# Patient Record
Sex: MALE | Race: OTHER | NOT HISPANIC OR LATINO | URBAN - METROPOLITAN AREA
[De-identification: names, ages, dates, MRNs, and addresses within clinical notes are randomized per-mention and may not be internally consistent; named-entity substitution may affect disease eponyms.]

---

## 2018-08-20 ENCOUNTER — EMERGENCY (EMERGENCY)
Facility: HOSPITAL | Age: 78
LOS: 1 days | Discharge: HOME | End: 2018-08-20
Admitting: EMERGENCY MEDICINE
Payer: MEDICARE

## 2018-08-20 VITALS
SYSTOLIC BLOOD PRESSURE: 141 MMHG | TEMPERATURE: 98 F | HEART RATE: 83 BPM | OXYGEN SATURATION: 100 % | DIASTOLIC BLOOD PRESSURE: 54 MMHG | RESPIRATION RATE: 18 BRPM

## 2018-08-20 PROCEDURE — 99283 EMERGENCY DEPT VISIT LOW MDM: CPT | Mod: 25
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
## 2018-08-21 RX ORDER — TETANUS TOXOID, REDUCED DIPHTHERIA TOXOID AND ACELLULAR PERTUSSIS VACCINE, ADSORBED 5; 2.5; 8; 8; 2.5 [IU]/.5ML; [IU]/.5ML; UG/.5ML; UG/.5ML; UG/.5ML
0.5 SUSPENSION INTRAMUSCULAR ONCE
Qty: 0 | Refills: 0 | Status: COMPLETED | OUTPATIENT
Start: 2018-08-21 | End: 2018-08-21

## 2018-08-21 RX ADMIN — TETANUS TOXOID, REDUCED DIPHTHERIA TOXOID AND ACELLULAR PERTUSSIS VACCINE, ADSORBED 0.5 MILLILITER(S): 5; 2.5; 8; 8; 2.5 SUSPENSION INTRAMUSCULAR at 03:21

## 2018-08-21 NOTE — ED PROVIDER NOTE - OBJECTIVE STATEMENT
79 yo M, nonsmoker with PMH of bradycardia scheduled for pacemaker on Wednesday, presents to ER s/p mechanical fall on Sunday night 11:55PM with upper lip swelling and pain. Pt states he was walking over the divider, tripped over a brick and fell forward striking cement on upper lip, no LOC, no head injury. Pt denies any prodrome prior to fall. Pt came to the ER with concern if the swelling will affects the intubation for the pacemaker procedure. Admits applying ice without much improvement. Pt admits he remember how he fall and was able to get up on his own. Denies any fever, chills, headache, dizziness, visual disturbances, neck pain, back pain, chest pain, sob, abdominal pain, dysuria, leg swelling, or any other complaints.

## 2018-08-21 NOTE — ED PROVIDER NOTE - PLAN OF CARE
Rest, drink plenty of fluids.  Advance activity as tolerated. Apply ice compress to affected area for 15 minutes, 3-4 times per day. Take Tylenol 650mg (Two 325 mg pills) every 4-6 hours as needed for pain. Follow up with your primary care physician in 48-72 hours- bring copies of your results.  Return to the ER for worsening or persistent symptoms, headache, dizziness, nausea, vomiting and/or ANY NEW OR CONCERNING SYMPTOMS. If you have issues obtaining follow up, please call: [phone] to obtain a doctor or specialist who takes your insurance in your area.

## 2018-08-21 NOTE — ED PROVIDER NOTE - MEDICAL DECISION MAKING DETAILS
77 yo M, nonsmoker with PMH of bradycardia scheduled for pacemaker on Wednesday, presents to ER s/p mechanical fall on Sunday night 11:55PM with upper lip swelling and pain.  -well appearing male with PMH of bradycardia, s/p mechanical fall with upper lip swelling 24 hours ago, no LOC, no head trauma, no focal neuro deficits, ambulatory without difficulty, no weakness, no numbness, admits taking baby aspirin daily, AAOx4. CT head offered to patient based on age and aspirin use, discussed risks for bleeding, pt refused CT head at this time. Plan: tetanus shot, ice compress, and discharge to f/u with PCP.

## 2018-08-21 NOTE — ED PROVIDER NOTE - CARE PLAN
Principal Discharge DX:	Fall  Assessment and plan of treatment:	Rest, drink plenty of fluids.  Advance activity as tolerated. Apply ice compress to affected area for 15 minutes, 3-4 times per day. Take Tylenol 650mg (Two 325 mg pills) every 4-6 hours as needed for pain. Follow up with your primary care physician in 48-72 hours- bring copies of your results.  Return to the ER for worsening or persistent symptoms, headache, dizziness, nausea, vomiting and/or ANY NEW OR CONCERNING SYMPTOMS. If you have issues obtaining follow up, please call: [phone] to obtain a doctor or specialist who takes your insurance in your area.  Secondary Diagnosis:	Lip laceration, initial encounter

## 2018-08-21 NOTE — ED PROVIDER NOTE - ATTENDING CONTRIBUTION TO CARE
DR. STEVENS, ATTENDING MD-  I performed a face to face bedside interview with patient regarding history of present illness, review of symptoms and past medical history. I completed an independent physical exam.  I have discussed patient's plan of care with PA.   Documentation as above in the note.   HPI: 77 yo M, nonsmoker with PMH of bradycardia scheduled for pacemaker on Wednesday, presents to ER s/p mechanical fall on > 24 hours prior to arrival with upper lip swelling and pain. Pt on ASA 81 mg daily only, no other Anticoagulants. Denies preceding conditions leading to fall including chest pain, palpitations, dizziness, vision/hearing changes, weakness, abd pain, N/V/D, fevers, chills. Came to be evaluated as he was worried it would effect his PPM procedure on Wed.   EXAM: Well appearing, NAD, eyes EOMI/PERRL, head atraumatic, neck supple, FROM, upper lip with dried blood, minimal swelling, no open wounds, gums and teeth intact, no gum lacerations, no missing teeth, chest wall stable, lungs ctab, heart RRR, abd soft nontender, pelvis stable, gait normal, weight bearing tolerated, stable. Neuro exam normal without any focal deficits  MDM: concern for mech slip and fall leading to upper lip lac/swelling, no other signs trauma and no syncope or presyncopal issues per pt report, benign exam other than above, tdap will be given as pt has unknown tdap history, pt was offered CT head and neck but refused. Also not clinically indicated but on ASA but no focal deficits and no signs severe trauma. Fall occurred > 24 hours ago and neuro intact as well as eating and drinking normally and baseline AOx4. Pt came to ED only to ensure he was clear to get PPM placement, explained that he should still go see EP on pre-scheduled visit to get medically cleared as we are not able to medically clear him otherwise. Pt aware and amenable to plan. Return precautions explained and understood.

## 2018-08-27 PROBLEM — R00.1 BRADYCARDIA, UNSPECIFIED: Chronic | Status: ACTIVE | Noted: 2018-08-21

## 2018-09-10 ENCOUNTER — APPOINTMENT (OUTPATIENT)
Dept: CARDIOLOGY | Facility: CLINIC | Age: 78
End: 2018-09-10
Payer: MEDICARE

## 2018-09-10 ENCOUNTER — NON-APPOINTMENT (OUTPATIENT)
Age: 78
End: 2018-09-10

## 2018-09-10 VITALS
WEIGHT: 80 LBS | OXYGEN SATURATION: 99 % | HEART RATE: 62 BPM | SYSTOLIC BLOOD PRESSURE: 159 MMHG | DIASTOLIC BLOOD PRESSURE: 84 MMHG | HEIGHT: 60 IN | BODY MASS INDEX: 15.71 KG/M2

## 2018-09-10 DIAGNOSIS — R06.02 SHORTNESS OF BREATH: ICD-10-CM

## 2018-09-10 DIAGNOSIS — H81.10 BENIGN PAROXYSMAL VERTIGO, UNSPECIFIED EAR: ICD-10-CM

## 2018-09-10 DIAGNOSIS — Z84.89 FAMILY HISTORY OF OTHER SPECIFIED CONDITIONS: ICD-10-CM

## 2018-09-10 DIAGNOSIS — Z87.81 PERSONAL HISTORY OF (HEALED) TRAUMATIC FRACTURE: ICD-10-CM

## 2018-09-10 DIAGNOSIS — R42 DIZZINESS AND GIDDINESS: ICD-10-CM

## 2018-09-10 DIAGNOSIS — Z78.9 OTHER SPECIFIED HEALTH STATUS: ICD-10-CM

## 2018-09-10 DIAGNOSIS — Z87.898 PERSONAL HISTORY OF OTHER SPECIFIED CONDITIONS: ICD-10-CM

## 2018-09-10 PROCEDURE — 99203 OFFICE O/P NEW LOW 30 MIN: CPT

## 2018-09-10 PROCEDURE — 93000 ELECTROCARDIOGRAM COMPLETE: CPT

## 2018-09-10 RX ORDER — ASPIRIN ENTERIC COATED TABLETS 81 MG 81 MG/1
81 TABLET, DELAYED RELEASE ORAL
Refills: 0 | Status: ACTIVE | COMMUNITY

## 2018-09-10 RX ORDER — MECLIZINE HYDROCHLORIDE 25 MG/1
25 TABLET ORAL 3 TIMES DAILY
Refills: 0 | Status: ACTIVE | COMMUNITY

## 2018-10-15 ENCOUNTER — APPOINTMENT (OUTPATIENT)
Dept: ELECTROPHYSIOLOGY | Facility: CLINIC | Age: 78
End: 2018-10-15

## 2018-11-16 ENCOUNTER — EMERGENCY (EMERGENCY)
Facility: HOSPITAL | Age: 78
LOS: 1 days | Discharge: HOME | End: 2018-11-16
Attending: EMERGENCY MEDICINE | Admitting: EMERGENCY MEDICINE
Payer: MEDICARE

## 2018-11-16 VITALS
HEART RATE: 80 BPM | DIASTOLIC BLOOD PRESSURE: 74 MMHG | OXYGEN SATURATION: 100 % | SYSTOLIC BLOOD PRESSURE: 147 MMHG | RESPIRATION RATE: 18 BRPM

## 2018-11-16 VITALS
RESPIRATION RATE: 17 BRPM | HEART RATE: 68 BPM | OXYGEN SATURATION: 100 % | SYSTOLIC BLOOD PRESSURE: 128 MMHG | DIASTOLIC BLOOD PRESSURE: 70 MMHG

## 2018-11-16 PROCEDURE — 12013 RPR F/E/E/N/L/M 2.6-5.0 CM: CPT | Mod: GC

## 2018-11-16 PROCEDURE — 70486 CT MAXILLOFACIAL W/O DYE: CPT | Mod: 26

## 2018-11-16 PROCEDURE — 76377 3D RENDER W/INTRP POSTPROCES: CPT | Mod: 26

## 2018-11-16 PROCEDURE — 99284 EMERGENCY DEPT VISIT MOD MDM: CPT | Mod: 25,GC

## 2018-11-16 PROCEDURE — 70450 CT HEAD/BRAIN W/O DYE: CPT | Mod: 26

## 2018-11-16 NOTE — ED PROVIDER NOTE - PHYSICAL EXAMINATION
Gen: AAOx3, NAD   Head: NCAT  ENT: Airway patent, moist mucous membranes, nasal passageways clear, no pharyngeal erythema or exudates, uvula midline, no cervical lymphadenopathy  Cardiac: Normal rate, normal rhythm, no murmurs/rubs/gallops appreciated  Respiratory: Lungs CTA B/L  Gastrointestinal: +BS, Abdomen soft, nontender, nondistended, no rebound, no guarding, no organomegaly   MSK: No gross abnormalities, FROM of all four extremities, no edema  HEME: Extremities warm, pulses intact and symmetrical in all four extremities  Skin: + upper lip lac     Neuro: No gross neurologic deficits, CN II-XII intact, no facial asymmetry, no dysarthria, dysdiadochokinesia, strength equal in all four extremities, no gait abnormality Gen: AAOx3, NAD   Head: NCAT  ENT: Airway patent, moist mucous membranes, nasal passageways clear, no pharyngeal erythema or exudates, uvula midline, no cervical lymphadenopathy  Cardiac: Normal rate, normal rhythm, no murmurs/rubs/gallops appreciated  Respiratory: Lungs CTA B/L  Gastrointestinal: +BS, Abdomen soft, nontender, nondistended, no rebound, no guarding, no organomegaly   MSK: No gross abnormalities, FROM of all four extremities, no edema, no midline spinal c-t-l spine tenderness   HEME: Extremities warm, pulses intact and symmetrical in all four extremities  Skin: + upper lip lac     Neuro: No gross neurologic deficits, CN II-XII intact, no facial asymmetry, no dysarthria, dysdiadochokinesia, strength equal in all four extremities, no gait abnormality Gen: AAOx3, NAD   Head: NCAT  ENT: Airway patent, moist mucous membranes, nasal passageways clear, no pharyngeal erythema or exudates, uvula midline, no cervical lymphadenopathy  Cardiac: Normal rate, normal rhythm, no murmurs/rubs/gallops appreciated  Respiratory: Lungs CTA B/L  Gastrointestinal: +BS, Abdomen soft, nontender, nondistended, no rebound, no guarding, no organomegaly   MSK: No gross abnormalities, FROM of all four extremities, no edema, no midline spinal c-t-l spine tenderness   HEME: Extremities warm, pulses intact and symmetrical in all four extremities  Skin: + upper lip lac and inner lip lac through and through     Neuro: No gross neurologic deficits, CN II-XII intact, no facial asymmetry, no dysarthria, dysdiadochokinesia, strength equal in all four extremities, no gait abnormality Gen: AAOx3, NAD   Head: NCAT  ENT: Airway patent, moist mucous membranes, nasal passageways clear,  + upper lip lac and inner lip lac through and through, teeth normal, no loose teeth, able to bite on tongue depressor without difficulty, mild left zygomatic ecchymosis w/out crepitus, EOMI, no nystagmus  Cardiac: Normal rate, normal rhythm, no murmurs/rubs/gallops appreciated  Respiratory: Lungs CTA B/L  Gastrointestinal: +BS, Abdomen soft, nontender, nondistended, no rebound, no guarding,   MSK: No gross abnormalities, FROM of all four extremities, no edema, no midline spinal c-t-l spine tenderness , no pelvic pain on compression  HEME: Extremities warm, pulses intact and symmetrical in all four extremities  Skin: + upper lip lac and inner lip lac through and through   Neuro: No gross neurologic deficits, CN II-XII intact, no facial asymmetry, no dysarthria, no dysdiadochokinesia, strength equal in all four extremities, no gait abnormality
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
## 2018-11-16 NOTE — ED PROVIDER NOTE - NSFOLLOWUPINSTRUCTIONS_ED_ALL_ED_FT
You have one prolene 6-0 suture on your outer lip, have it removed in 7-10 days. Please return if you develop severe lip swelling or puss or fevers or signs of infection. Please see your dentist this week to evaluate your teeth, your CT scans were within normal. Please apply ice to the lip to decrease swelling. Please eat liquids/ soft foods for the next 48-72 hours.     Take tylenol 650 mg every 4 hours as needed for pain, max daily dose 4000 mg/day.   Return to the emergency department for blood in urine, worsening loss of bladder control/loss of bowel control, fever, vomiting, inability to walk, weakness/numbness, or if you have any new or changing symptoms or concerns.    1) Please follow-up with your primary care doctor and dentist within the next 3 days.  Please call today or tomorrow for an appointment.  If you cannot follow-up with your doctor(s), please return to the ED for any urgent issues.  2) If you have any worsening of symptoms or any other concerns please return to the ED immediately.  3) Please continue taking your home medications as directed.  4) You may have been given a copy of your labs and/or imaging.  Please go over these with your primary care doctor.

## 2018-11-16 NOTE — ED PROVIDER NOTE - PROGRESS NOTE DETAILS
Attending MD Monreal: Dental with patient Attending MD Monreal: spoke with dental, no loose teeth, no fractures.  Requested ED close laceration, will sign out to overnight team for laceration repair. Slaughter PGY2: lac repaired w/ one outer 6-0 prolene and 10 interrupted 6-0 absorbables inner lip, , will f/u w/ outpt dentist Tabby CARTY: Signed out by Dr. Monreal pending lac repair. Lac repaired by Dr. Slaughter, reviewed by me. Ok to discharge. Slaughter PGY2: lac repaired w/ one outer 6-0 prolene and 10 interrupted 6-0 absorbables inner lip, , will f/u w/ outpt dentist, family at bedside w/ pt, pt alert and well appearing
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
## 2018-11-16 NOTE — ED ADULT NURSE NOTE - NSIMPLEMENTINTERV_GEN_ALL_ED
Implemented All Fall with Harm Risk Interventions:  [place] to call system. Call bell, personal items and telephone within reach. Instruct patient to call for assistance. Room bathroom lighting operational. Non-slip footwear when patient is off stretcher. Physically safe environment: no spills, clutter or unnecessary equipment. Stretcher in lowest position, wheels locked, appropriate side rails in place. Provide visual cue, wrist band, yellow gown, etc. Monitor gait and stability. Monitor for mental status changes and reorient to person, place, and time. Review medications for side effects contributing to fall risk. Reinforce activity limits and safety measures with patient and family. Provide visual clues: red socks.
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
## 2018-11-16 NOTE — ED PROVIDER NOTE - NS ED ROS FT
CONST: no fevers, no chills, weight loss, weakness, appetite changes  EYES: no pain, vision loss/dipoplia/decreased vision  ENT: no sore throat, no cough  CV: no chest pain, no palpitations  RESP: no shortness of breath  ABD: no abdominal pain, no nausea, no vomitting  : no dysuria, increased frequency, or hematuria  MSK: no back pain  NEURO: no headache or additional neurologic complaints  HEME: no easy bleeding  SKIN:  no rash

## 2018-11-17 PROCEDURE — 70450 CT HEAD/BRAIN W/O DYE: CPT

## 2018-11-17 PROCEDURE — 70486 CT MAXILLOFACIAL W/O DYE: CPT

## 2018-11-17 PROCEDURE — 76377 3D RENDER W/INTRP POSTPROCES: CPT

## 2018-11-17 PROCEDURE — 12013 RPR F/E/E/N/L/M 2.6-5.0 CM: CPT

## 2018-11-17 PROCEDURE — 99284 EMERGENCY DEPT VISIT MOD MDM: CPT | Mod: 25

## 2018-11-17 RX ORDER — AMOXICILLIN 250 MG/5ML
1 SUSPENSION, RECONSTITUTED, ORAL (ML) ORAL
Qty: 15 | Refills: 0
Start: 2018-11-17 | End: 2018-11-21

## 2018-11-17 RX ORDER — AMOXICILLIN 250 MG/5ML
500 SUSPENSION, RECONSTITUTED, ORAL (ML) ORAL ONCE
Qty: 0 | Refills: 0 | Status: COMPLETED | OUTPATIENT
Start: 2018-11-17 | End: 2018-11-17

## 2018-11-17 RX ORDER — ACETAMINOPHEN 500 MG
650 TABLET ORAL ONCE
Qty: 0 | Refills: 0 | Status: COMPLETED | OUTPATIENT
Start: 2018-11-17 | End: 2018-11-17

## 2018-11-17 RX ADMIN — Medication 650 MILLIGRAM(S): at 00:15

## 2018-11-17 RX ADMIN — Medication 650 MILLIGRAM(S): at 02:00

## 2018-11-17 NOTE — ED PROCEDURE NOTE - ATTENDING CONTRIBUTION TO CARE
I have participated in and supervised all key portions of the above procedures and agree with the above documentation. - Tabby CARTY

## 2018-11-17 NOTE — ED PROCEDURE NOTE - PROCEDURE ADDITIONAL DETAILS
one 6-0 prolene to outter lip area and 10 absorbable chromic 6-0 sutures appled to inner upper lip lac, outer lac was approx 0.5-1cm
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
## 2018-11-19 ENCOUNTER — APPOINTMENT (OUTPATIENT)
Dept: CARDIOLOGY | Facility: CLINIC | Age: 78
End: 2018-11-19

## 2018-12-02 ENCOUNTER — EMERGENCY (EMERGENCY)
Facility: HOSPITAL | Age: 78
LOS: 1 days | Discharge: HOME | End: 2018-12-02
Attending: EMERGENCY MEDICINE
Payer: MEDICARE

## 2018-12-02 VITALS
RESPIRATION RATE: 18 BRPM | HEART RATE: 90 BPM | SYSTOLIC BLOOD PRESSURE: 144 MMHG | DIASTOLIC BLOOD PRESSURE: 77 MMHG | TEMPERATURE: 99 F | OXYGEN SATURATION: 99 %

## 2018-12-02 PROCEDURE — 99283 EMERGENCY DEPT VISIT LOW MDM: CPT

## 2018-12-02 PROCEDURE — 73562 X-RAY EXAM OF KNEE 3: CPT

## 2018-12-02 PROCEDURE — 73562 X-RAY EXAM OF KNEE 3: CPT | Mod: 26,RT

## 2018-12-02 NOTE — ED PROVIDER NOTE - PROGRESS NOTE DETAILS
Now pt c/o right knee pain since the fall 2 weeks ago. + Ambulatory with steady gait. No obvious swelling to right knee, tender on anterior knee, full ROMs, No patellar tenderness. N/v- intact.

## 2018-12-02 NOTE — ED PROVIDER NOTE - MEDICAL DECISION MAKING DETAILS
79 y/o male with lip laceration s/p fall 2 weeks ago came for suture removal. No sutures were found as they were done with absorbable sutures, and one non-absorbable fell off. He continues to have knee pain. Will do x-ray of knee and reassess.  ATTG: Dr. Huynh

## 2018-12-02 NOTE — ED PROVIDER NOTE - CARE PLAN
Principal Discharge DX:	Suture check Principal Discharge DX:	Suture check  Secondary Diagnosis:	Knee contracture, right Principal Discharge DX:	Suture check  Secondary Diagnosis:	Knee contracture, right  Secondary Diagnosis:	Knee contusion

## 2018-12-02 NOTE — ED PROVIDER NOTE - PHYSICAL EXAMINATION
NAD, VSS, Afebrile, No visualized stitches on upper lip with well healed wound (internal or external). No warmth, eryth or cellulitis. Neuro- intact.

## 2018-12-02 NOTE — ED PROVIDER NOTE - OBJECTIVE STATEMENT
77yo male pt 79yo male pt, ambulatory c.o suture removal. Pt stated he fell, slipped on a metal sign and fell forward, and had stitches on upper lip (inner and outer) in ED. Denies headache or fever/ chills. Denies sensory changes or weakness to extremities. Denies neck or back pain. Denies CP/SOB/ABD pain.

## 2019-04-24 ENCOUNTER — APPOINTMENT (OUTPATIENT)
Dept: CARDIOLOGY | Facility: CLINIC | Age: 79
End: 2019-04-24
Payer: MEDICARE

## 2019-04-24 ENCOUNTER — NON-APPOINTMENT (OUTPATIENT)
Age: 79
End: 2019-04-24

## 2019-04-24 VITALS
WEIGHT: 82 LBS | SYSTOLIC BLOOD PRESSURE: 126 MMHG | BODY MASS INDEX: 16.1 KG/M2 | HEART RATE: 79 BPM | DIASTOLIC BLOOD PRESSURE: 76 MMHG | HEIGHT: 60 IN | OXYGEN SATURATION: 99 %

## 2019-04-24 PROCEDURE — 93000 ELECTROCARDIOGRAM COMPLETE: CPT

## 2019-04-24 PROCEDURE — 99215 OFFICE O/P EST HI 40 MIN: CPT

## 2019-04-24 NOTE — REASON FOR VISIT
[Follow-Up - Clinic] : a clinic follow-up of [Cardiomyopathy] : cardiomyopathy [Family Member] : family member [FreeTextEntry1] : LBBB

## 2019-04-24 NOTE — PHYSICAL EXAM
[General Appearance - Well Developed] : well developed [Normal Appearance] : normal appearance [General Appearance - Well Nourished] : well nourished [Well Groomed] : well groomed [No Deformities] : no deformities [Normal Conjunctiva] : the conjunctiva exhibited no abnormalities [General Appearance - In No Acute Distress] : no acute distress [Normal Jugular Venous A Waves Present] : normal jugular venous A waves present [Eyelids - No Xanthelasma] : the eyelids demonstrated no xanthelasmas [Normal Jugular Venous V Waves Present] : normal jugular venous V waves present [No Jugular Venous Cade A Waves] : no jugular venous cade A waves [Respiration, Rhythm And Depth] : normal respiratory rhythm and effort [Heart Rate And Rhythm] : heart rate and rhythm were normal [Auscultation Breath Sounds / Voice Sounds] : lungs were clear to auscultation bilaterally [Exaggerated Use Of Accessory Muscles For Inspiration] : no accessory muscle use [Murmurs] : no murmurs present [Heart Sounds] : normal S1 and S2 [Abdomen Tenderness] : non-tender [Abdomen Soft] : soft [Abdomen Mass (___ Cm)] : no abdominal mass palpated [Abnormal Walk] : normal gait [Gait - Sufficient For Exercise Testing] : the gait was sufficient for exercise testing [Cyanosis, Localized] : no localized cyanosis [Nail Clubbing] : no clubbing of the fingernails [Petechial Hemorrhages (___cm)] : no petechial hemorrhages [] : no ischemic changes

## 2019-04-24 NOTE — DISCUSSION/SUMMARY
[FreeTextEntry1] : In summary the patient is a 78-year-old gentleman with dilated cardiomyopathy and left bundle branch block. Will increase his carvedilol dose 6.25 mg bid

## 2019-04-24 NOTE — HISTORY OF PRESENT ILLNESS
[FreeTextEntry1] : I had the pleasure of seeing your patient Bari Russo today.  s you well the patient is a delightful 79-year-old gentleman with a history of left bundle branch block and cardiomyopathy.  He had a PPM placed at [facility] 11/19.  He feels better since the ppm.  He has an exercise tolerance 1/8-1/4 mile.  No chest pain or sob.  He has some evidence of palpitations in the evening.\par \par

## 2019-07-18 ENCOUNTER — RX RENEWAL (OUTPATIENT)
Age: 79
End: 2019-07-18

## 2020-02-19 ENCOUNTER — APPOINTMENT (OUTPATIENT)
Dept: CARDIOLOGY | Facility: CLINIC | Age: 80
End: 2020-02-19

## 2020-02-19 ENCOUNTER — APPOINTMENT (OUTPATIENT)
Dept: CARDIOLOGY | Facility: CLINIC | Age: 80
End: 2020-02-19
Payer: MEDICARE

## 2020-02-19 ENCOUNTER — NON-APPOINTMENT (OUTPATIENT)
Age: 80
End: 2020-02-19

## 2020-02-19 VITALS
WEIGHT: 83 LBS | HEIGHT: 60 IN | DIASTOLIC BLOOD PRESSURE: 77 MMHG | OXYGEN SATURATION: 100 % | HEART RATE: 67 BPM | BODY MASS INDEX: 16.3 KG/M2 | SYSTOLIC BLOOD PRESSURE: 136 MMHG

## 2020-02-19 DIAGNOSIS — I44.7 LEFT BUNDLE-BRANCH BLOCK, UNSPECIFIED: ICD-10-CM

## 2020-02-19 DIAGNOSIS — I42.0 DILATED CARDIOMYOPATHY: ICD-10-CM

## 2020-02-19 PROCEDURE — 99214 OFFICE O/P EST MOD 30 MIN: CPT

## 2020-02-19 PROCEDURE — 93000 ELECTROCARDIOGRAM COMPLETE: CPT

## 2020-02-19 RX ORDER — AMLODIPINE BESYLATE 2.5 MG/1
2.5 TABLET ORAL DAILY
Qty: 30 | Refills: 1 | Status: DISCONTINUED | COMMUNITY
End: 2020-02-19

## 2020-02-19 NOTE — REASON FOR VISIT
[Follow-Up - Clinic] : a clinic follow-up of [Hypertension] : hypertension [FreeTextEntry1] : LBBB [Cardiomyopathy] : cardiomyopathy [Family Member] : family member

## 2020-02-19 NOTE — PHYSICAL EXAM
[General Appearance - Well Developed] : well developed [Well Groomed] : well groomed [Normal Appearance] : normal appearance [General Appearance - In No Acute Distress] : no acute distress [No Deformities] : no deformities [General Appearance - Well Nourished] : well nourished [Normal Jugular Venous A Waves Present] : normal jugular venous A waves present [Normal Conjunctiva] : the conjunctiva exhibited no abnormalities [Eyelids - No Xanthelasma] : the eyelids demonstrated no xanthelasmas [No Jugular Venous Cade A Waves] : no jugular venous cade A waves [Normal Jugular Venous V Waves Present] : normal jugular venous V waves present [Auscultation Breath Sounds / Voice Sounds] : lungs were clear to auscultation bilaterally [Respiration, Rhythm And Depth] : normal respiratory rhythm and effort [Exaggerated Use Of Accessory Muscles For Inspiration] : no accessory muscle use [Murmurs] : no murmurs present [Heart Sounds] : normal S1 and S2 [Heart Rate And Rhythm] : heart rate and rhythm were normal [Abdomen Tenderness] : non-tender [Abdomen Soft] : soft [Gait - Sufficient For Exercise Testing] : the gait was sufficient for exercise testing [Abdomen Mass (___ Cm)] : no abdominal mass palpated [Abnormal Walk] : normal gait [Nail Clubbing] : no clubbing of the fingernails [Cyanosis, Localized] : no localized cyanosis [Petechial Hemorrhages (___cm)] : no petechial hemorrhages [] : no ischemic changes

## 2020-02-19 NOTE — HISTORY OF PRESENT ILLNESS
[FreeTextEntry1] : I had the pleasure of seeing your patient Bari Russo today. He is a delightful 79-year-old gentleman with a history of left bundle branch block and cardiomyopathy.  He had a PPM placed at [place] 11/19.  He feels better since the ppm.  He has an exercise tolerance 1/8-1/4 mile.  No chest pain or sob.  He has some evidence of palpitations in the evening.\par \par

## 2020-02-19 NOTE — DISCUSSION/SUMMARY
[FreeTextEntry1] : In summary the patient is a 78-year-old gentleman with dilated cardiomyopathy and left bundle branch block who is s/p PPM.  Continue current meds.

## 2020-09-14 ENCOUNTER — RX RENEWAL (OUTPATIENT)
Age: 80
End: 2020-09-14

## 2020-09-14 RX ORDER — CARVEDILOL 12.5 MG/1
12.5 TABLET, FILM COATED ORAL TWICE DAILY
Qty: 180 | Refills: 1 | Status: ACTIVE | COMMUNITY
Start: 2019-04-24 | End: 1900-01-01

## 2021-04-18 ENCOUNTER — EMERGENCY (EMERGENCY)
Facility: HOSPITAL | Age: 81
LOS: 1 days | Discharge: HOME | End: 2021-04-18
Attending: EMERGENCY MEDICINE | Admitting: EMERGENCY MEDICINE
Payer: MEDICARE

## 2021-04-18 VITALS
SYSTOLIC BLOOD PRESSURE: 151 MMHG | OXYGEN SATURATION: 100 % | HEART RATE: 77 BPM | TEMPERATURE: 98 F | RESPIRATION RATE: 16 BRPM | DIASTOLIC BLOOD PRESSURE: 77 MMHG

## 2021-04-18 LAB
ALBUMIN SERPL ELPH-MCNC: 4.4 G/DL — SIGNIFICANT CHANGE UP (ref 3.3–5)
ALP SERPL-CCNC: 72 U/L — SIGNIFICANT CHANGE UP (ref 40–120)
ALT FLD-CCNC: 15 U/L — SIGNIFICANT CHANGE UP (ref 4–41)
ANION GAP SERPL CALC-SCNC: 10 MMOL/L — SIGNIFICANT CHANGE UP (ref 7–14)
APPEARANCE UR: CLEAR — SIGNIFICANT CHANGE UP
AST SERPL-CCNC: 22 U/L — SIGNIFICANT CHANGE UP (ref 4–40)
BASOPHILS # BLD AUTO: 0.04 K/UL — SIGNIFICANT CHANGE UP (ref 0–0.2)
BASOPHILS NFR BLD AUTO: 0.9 % — SIGNIFICANT CHANGE UP (ref 0–2)
BILIRUB SERPL-MCNC: 0.2 MG/DL — SIGNIFICANT CHANGE UP (ref 0.2–1.2)
BILIRUB UR-MCNC: NEGATIVE — SIGNIFICANT CHANGE UP
BUN SERPL-MCNC: 23 MG/DL — SIGNIFICANT CHANGE UP (ref 7–23)
CALCIUM SERPL-MCNC: 9.2 MG/DL — SIGNIFICANT CHANGE UP (ref 8.4–10.5)
CHLORIDE SERPL-SCNC: 107 MMOL/L — SIGNIFICANT CHANGE UP (ref 98–107)
CO2 SERPL-SCNC: 25 MMOL/L — SIGNIFICANT CHANGE UP (ref 22–31)
COLOR SPEC: SIGNIFICANT CHANGE UP
CREAT SERPL-MCNC: 1 MG/DL — SIGNIFICANT CHANGE UP (ref 0.5–1.3)
DIFF PNL FLD: NEGATIVE — SIGNIFICANT CHANGE UP
EOSINOPHIL # BLD AUTO: 0.11 K/UL — SIGNIFICANT CHANGE UP (ref 0–0.5)
EOSINOPHIL NFR BLD AUTO: 2.4 % — SIGNIFICANT CHANGE UP (ref 0–6)
GLUCOSE SERPL-MCNC: 88 MG/DL — SIGNIFICANT CHANGE UP (ref 70–99)
GLUCOSE UR QL: NEGATIVE — SIGNIFICANT CHANGE UP
HCT VFR BLD CALC: 31.6 % — LOW (ref 39–50)
HGB BLD-MCNC: 10 G/DL — LOW (ref 13–17)
IANC: 2.81 K/UL — SIGNIFICANT CHANGE UP (ref 1.5–8.5)
IMM GRANULOCYTES NFR BLD AUTO: 0.2 % — SIGNIFICANT CHANGE UP (ref 0–1.5)
KETONES UR-MCNC: NEGATIVE — SIGNIFICANT CHANGE UP
LEUKOCYTE ESTERASE UR-ACNC: NEGATIVE — SIGNIFICANT CHANGE UP
LIDOCAIN IGE QN: 63 U/L — HIGH (ref 7–60)
LYMPHOCYTES # BLD AUTO: 1.04 K/UL — SIGNIFICANT CHANGE UP (ref 1–3.3)
LYMPHOCYTES # BLD AUTO: 22.4 % — SIGNIFICANT CHANGE UP (ref 13–44)
MCHC RBC-ENTMCNC: 31.2 PG — SIGNIFICANT CHANGE UP (ref 27–34)
MCHC RBC-ENTMCNC: 31.6 GM/DL — LOW (ref 32–36)
MCV RBC AUTO: 98.4 FL — SIGNIFICANT CHANGE UP (ref 80–100)
MONOCYTES # BLD AUTO: 0.64 K/UL — SIGNIFICANT CHANGE UP (ref 0–0.9)
MONOCYTES NFR BLD AUTO: 13.8 % — SIGNIFICANT CHANGE UP (ref 2–14)
NEUTROPHILS # BLD AUTO: 2.81 K/UL — SIGNIFICANT CHANGE UP (ref 1.8–7.4)
NEUTROPHILS NFR BLD AUTO: 60.3 % — SIGNIFICANT CHANGE UP (ref 43–77)
NITRITE UR-MCNC: NEGATIVE — SIGNIFICANT CHANGE UP
NRBC # BLD: 0 /100 WBCS — SIGNIFICANT CHANGE UP
NRBC # FLD: 0 K/UL — SIGNIFICANT CHANGE UP
PH UR: 6.5 — SIGNIFICANT CHANGE UP (ref 5–8)
PLATELET # BLD AUTO: 210 K/UL — SIGNIFICANT CHANGE UP (ref 150–400)
POTASSIUM SERPL-MCNC: 4.8 MMOL/L — SIGNIFICANT CHANGE UP (ref 3.5–5.3)
POTASSIUM SERPL-SCNC: 4.8 MMOL/L — SIGNIFICANT CHANGE UP (ref 3.5–5.3)
PROT SERPL-MCNC: 7.1 G/DL — SIGNIFICANT CHANGE UP (ref 6–8.3)
PROT UR-MCNC: ABNORMAL
RBC # BLD: 3.21 M/UL — LOW (ref 4.2–5.8)
RBC # FLD: 13.4 % — SIGNIFICANT CHANGE UP (ref 10.3–14.5)
SODIUM SERPL-SCNC: 142 MMOL/L — SIGNIFICANT CHANGE UP (ref 135–145)
SP GR SPEC: 1.02 — SIGNIFICANT CHANGE UP (ref 1.01–1.02)
UROBILINOGEN FLD QL: SIGNIFICANT CHANGE UP
WBC # BLD: 4.65 K/UL — SIGNIFICANT CHANGE UP (ref 3.8–10.5)
WBC # FLD AUTO: 4.65 K/UL — SIGNIFICANT CHANGE UP (ref 3.8–10.5)

## 2021-04-18 PROCEDURE — 71250 CT THORAX DX C-: CPT | Mod: 26

## 2021-04-18 PROCEDURE — 71101 X-RAY EXAM UNILAT RIBS/CHEST: CPT | Mod: 26,RT

## 2021-04-18 PROCEDURE — 99284 EMERGENCY DEPT VISIT MOD MDM: CPT

## 2021-04-18 PROCEDURE — 74176 CT ABD & PELVIS W/O CONTRAST: CPT | Mod: 26

## 2021-04-18 RX ORDER — IBUPROFEN 200 MG
400 TABLET ORAL ONCE
Refills: 0 | Status: COMPLETED | OUTPATIENT
Start: 2021-04-18 | End: 2021-04-18

## 2021-04-18 RX ORDER — LIDOCAINE 4 G/100G
1 CREAM TOPICAL ONCE
Refills: 0 | Status: COMPLETED | OUTPATIENT
Start: 2021-04-18 | End: 2021-04-18

## 2021-04-18 RX ORDER — ACETAMINOPHEN 500 MG
650 TABLET ORAL ONCE
Refills: 0 | Status: COMPLETED | OUTPATIENT
Start: 2021-04-18 | End: 2021-04-18

## 2021-04-18 RX ADMIN — LIDOCAINE 1 PATCH: 4 CREAM TOPICAL at 21:04

## 2021-04-18 RX ADMIN — LIDOCAINE 1 PATCH: 4 CREAM TOPICAL at 21:01
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
## 2021-04-18 NOTE — ED PROVIDER NOTE - NS ED ROS FT
Constitutional: no fevers, no chills.  Eyes: no visual changes.  Ears: no ear drainage, no ear pain.  Nose: no nasal congestion.  Mouth/Throat: no sore throat.  Cardiovascular: no chest pain.  Respiratory: no shortness of breath, no wheezing, no cough  Gastrointestinal: no nausea, no vomiting, no diarrhea, no abdominal pain.  MSK: no flank pain, no back pain. +R rib trauma   Genitourinary: no dysuria, no hematuria.  Skin: no rashes.  Neuro: no headache

## 2021-04-18 NOTE — ED ADULT NURSE NOTE - INTERVENTIONS DEFINITIONS
[place] to call system/Call bell, personal items and telephone within reach/Instruct patient to call for assistance/Physically safe environment: no spills, clutter or unnecessary equipment/Stretcher in lowest position, wheels locked, appropriate side rails in place

## 2021-04-18 NOTE — ED ADULT NURSE NOTE - CHIEF COMPLAINT
[de-identified] : Going for TSPR with Dr. Rodriguez 9/26/19\par Notes intermittent nasal congestion and sinus pressure.  Uses Flonase and Allegra.  No hx of nasal or sinus surgery.  Decreased parenteral vision.  Sense of smell and taste are good.  \par Also notes occasional spasms in his throat occasionally when eating.  No choking. no throat pain.  Voice is good.  Occasionally gets reflux.  Has taken reflux meds in the past, not on it now.  The patient is a 80y Male complaining of fall.

## 2021-04-18 NOTE — ED PROVIDER NOTE - PHYSICAL EXAMINATION
GEN: Well appearing, well nourished, in no apparent distress.  HEAD: NCAT  HEENT: PERRL, Airway patent, EOMI, non-erythematous pharynx, no exudates, uvula midline, MMM, neck supple, no LAD, no JVD  LUNG: CTAB, no adventitious sounds, no retractions, no nasal flaring  CV: RRR, no murmurs,   Abd: soft, NTND, no rebound or guarding, BS+ in all quadrants, no CVAT  MSK: WWP, Pulses 2+ in extremities, No edema, TTP R posterior and anterior ribs  Neuro:  AAOx3, Ambulatory with stable gait. CALL without laterality  Skin: Warm and dry, no evidence of rash  Psych: normal mood and affect

## 2021-04-18 NOTE — ED PROVIDER NOTE - NSFOLLOWUPINSTRUCTIONS_ED_ALL_ED_FT
You have a rib fracture.     - Take tylenol 650 mg by mouth every 4-6 hours as needed for pain.     - For SEVERE PAIN, Take Oxycodone one tab every 6 hours as needed. NEVER DRIVE OR OPERATE MACHINERY ON OXYCODONE IT WILL CAUSE ACCIDENTS.     - Continue to use the breathing machine as instructed.     - Please see your regular doctor within 72 hours for follow-up care. Return to ER for new or worsening symptoms like coughing or fevers over 100.4F.
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
## 2021-04-18 NOTE — ED PROVIDER NOTE - ATTENDING CONTRIBUTION TO CARE
Statement Selected ARGENTINA Hurtado MD: I have personally performed a face to face bedside history and physical examination of this patient. I have discussed the history, examination, review of systems, assessment and plan of management with the resident. I have reviewed the electronic medical record and amended it to reflect my history, review of systems, physical exam, assessment and plan.   GEN - NAD; well appearing; thin A+O x3   HEAD - NC/AT     EYES - EOMI, no conjunctival pallor, no scleral icterus  ENT -   mucous membranes  moist , no discharge      NECK - Neck supple  PULM - CTA b/l,  symmetric breath sounds, right chest wall TTP   COR -  RRR, S1 S2, no murmurs  ABD - , ND, NT, soft, no guarding, no rebound, no masses    BACK - no CVA tenderness, nontender spine     EXTREMS - no edema, no deformity, warm and well perfused    SKIN - no rash or bruising      NEUROLOGIC - alert, sensation nl, motor 5/5 RUE/LUE/RLE/LLE
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
## 2021-04-18 NOTE — ED PROVIDER NOTE - PATIENT PORTAL LINK FT
You can access the FollowMyHealth Patient Portal offered by [facility] by registering at the following website: [email or web address]. By joining [facility]’s FollowMyHealth portal, you will also be able to view your health information using other applications (apps) compatible with our system.
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
## 2021-04-18 NOTE — ED ADULT TRIAGE NOTE - RESPIRATORY RATE (BREATHS/MIN)
Farnaz Grewal is a 69 year old female presenting with preop.  Pt is having cataract surgery R eye 10/19/20  Medications reviewed and updated.  Denies known Latex allergy or symptoms of Latex sensitivity.  Health Maintenance Due   Topic Date Due   • Depression Screening  04/02/1963                    16

## 2021-04-19 VITALS
RESPIRATION RATE: 17 BRPM | HEART RATE: 63 BPM | DIASTOLIC BLOOD PRESSURE: 85 MMHG | SYSTOLIC BLOOD PRESSURE: 153 MMHG | OXYGEN SATURATION: 97 %

## 2021-04-19 RX ORDER — IBUPROFEN 200 MG
400 TABLET ORAL ONCE
Refills: 0 | Status: COMPLETED | OUTPATIENT
Start: 2021-04-19 | End: 2021-04-19

## 2021-04-19 RX ORDER — ACETAMINOPHEN 500 MG
650 TABLET ORAL ONCE
Refills: 0 | Status: COMPLETED | OUTPATIENT
Start: 2021-04-19 | End: 2021-04-19

## 2021-04-19 RX ORDER — OXYCODONE HYDROCHLORIDE 5 MG/1
1 TABLET ORAL
Qty: 9 | Refills: 0
Start: 2021-04-19 | End: 2021-04-21

## 2021-04-19 RX ADMIN — Medication 400 MILLIGRAM(S): at 02:16

## 2021-04-19 RX ADMIN — Medication 650 MILLIGRAM(S): at 02:16

## 2021-05-01 ENCOUNTER — INPATIENT (INPATIENT)
Facility: HOSPITAL | Age: 81
LOS: 3 days | Discharge: HOME | DRG: 183 | End: 2021-05-05
Attending: HOSPITALIST | Admitting: HOSPITALIST
Payer: MEDICARE

## 2021-05-01 ENCOUNTER — EMERGENCY (EMERGENCY)
Facility: HOSPITAL | Age: 81
LOS: 1 days | Discharge: ANOTHER HOSPITAL | End: 2021-05-01
Attending: STUDENT IN AN ORGANIZED HEALTH CARE EDUCATION/TRAINING PROGRAM | Admitting: STUDENT IN AN ORGANIZED HEALTH CARE EDUCATION/TRAINING PROGRAM
Payer: MEDICARE

## 2021-05-01 VITALS
TEMPERATURE: 98 F | DIASTOLIC BLOOD PRESSURE: 66 MMHG | RESPIRATION RATE: 16 BRPM | HEART RATE: 62 BPM | OXYGEN SATURATION: 100 % | SYSTOLIC BLOOD PRESSURE: 135 MMHG

## 2021-05-01 VITALS
DIASTOLIC BLOOD PRESSURE: 71 MMHG | OXYGEN SATURATION: 100 % | RESPIRATION RATE: 16 BRPM | HEART RATE: 78 BPM | SYSTOLIC BLOOD PRESSURE: 148 MMHG

## 2021-05-01 VITALS
HEART RATE: 63 BPM | TEMPERATURE: 98 F | SYSTOLIC BLOOD PRESSURE: 169 MMHG | WEIGHT: 82.89 LBS | OXYGEN SATURATION: 100 % | DIASTOLIC BLOOD PRESSURE: 79 MMHG | RESPIRATION RATE: 20 BRPM | HEIGHT: 60 IN

## 2021-05-01 LAB
ALBUMIN SERPL ELPH-MCNC: 4.5 G/DL — SIGNIFICANT CHANGE UP (ref 3.3–5)
ALP SERPL-CCNC: 78 U/L — SIGNIFICANT CHANGE UP (ref 40–120)
ALT FLD-CCNC: 10 U/L — SIGNIFICANT CHANGE UP (ref 4–41)
ANION GAP SERPL CALC-SCNC: 11 MMOL/L — SIGNIFICANT CHANGE UP (ref 7–14)
APPEARANCE UR: ABNORMAL
APTT BLD: 31.2 SEC — SIGNIFICANT CHANGE UP (ref 27–36.3)
AST SERPL-CCNC: 18 U/L — SIGNIFICANT CHANGE UP (ref 4–40)
BACTERIA # UR AUTO: NEGATIVE — SIGNIFICANT CHANGE UP
BASE EXCESS BLDV CALC-SCNC: -0.2 MMOL/L — SIGNIFICANT CHANGE UP (ref -3–2)
BASOPHILS # BLD AUTO: 0.04 K/UL — SIGNIFICANT CHANGE UP (ref 0–0.2)
BASOPHILS NFR BLD AUTO: 0.5 % — SIGNIFICANT CHANGE UP (ref 0–2)
BILIRUB SERPL-MCNC: 0.2 MG/DL — SIGNIFICANT CHANGE UP (ref 0.2–1.2)
BILIRUB UR-MCNC: NEGATIVE — SIGNIFICANT CHANGE UP
BLD GP AB SCN SERPL QL: NEGATIVE — SIGNIFICANT CHANGE UP
BLOOD GAS VENOUS - CREATININE: 1.7 MG/DL — HIGH (ref 0.5–1.3)
BLOOD GAS VENOUS COMPREHENSIVE RESULT: SIGNIFICANT CHANGE UP
BUN SERPL-MCNC: 33 MG/DL — HIGH (ref 7–23)
CALCIUM SERPL-MCNC: 9.1 MG/DL — SIGNIFICANT CHANGE UP (ref 8.4–10.5)
CHLORIDE BLDV-SCNC: 103 MMOL/L — SIGNIFICANT CHANGE UP (ref 96–108)
CHLORIDE SERPL-SCNC: 101 MMOL/L — SIGNIFICANT CHANGE UP (ref 98–107)
CO2 SERPL-SCNC: 24 MMOL/L — SIGNIFICANT CHANGE UP (ref 22–31)
COLOR SPEC: ABNORMAL
CREAT SERPL-MCNC: 1.54 MG/DL — HIGH (ref 0.5–1.3)
DIFF PNL FLD: NEGATIVE — SIGNIFICANT CHANGE UP
EOSINOPHIL # BLD AUTO: 0.04 K/UL — SIGNIFICANT CHANGE UP (ref 0–0.5)
EOSINOPHIL NFR BLD AUTO: 0.5 % — SIGNIFICANT CHANGE UP (ref 0–6)
EPI CELLS # UR: 2 /HPF — SIGNIFICANT CHANGE UP (ref 0–5)
GAS PNL BLDV: 140 MMOL/L — SIGNIFICANT CHANGE UP (ref 136–146)
GLUCOSE BLDV-MCNC: 104 MG/DL — HIGH (ref 70–99)
GLUCOSE SERPL-MCNC: 105 MG/DL — HIGH (ref 70–99)
GLUCOSE UR QL: NEGATIVE — SIGNIFICANT CHANGE UP
GRAN CASTS # UR COMP ASSIST: 2 /LPF — HIGH
HCO3 BLDV-SCNC: 22 MMOL/L — SIGNIFICANT CHANGE UP (ref 20–27)
HCT VFR BLD CALC: 32.5 % — LOW (ref 39–50)
HCT VFR BLDA CALC: 33.6 % — LOW (ref 39–51)
HGB BLD CALC-MCNC: 10.9 G/DL — LOW (ref 13–17)
HGB BLD-MCNC: 10.5 G/DL — LOW (ref 13–17)
HYALINE CASTS # UR AUTO: 5 /LPF — SIGNIFICANT CHANGE UP (ref 0–7)
IANC: 6.06 K/UL — SIGNIFICANT CHANGE UP (ref 1.5–8.5)
IMM GRANULOCYTES NFR BLD AUTO: 0.3 % — SIGNIFICANT CHANGE UP (ref 0–1.5)
INR BLD: 1.05 RATIO — SIGNIFICANT CHANGE UP (ref 0.88–1.16)
KETONES UR-MCNC: NEGATIVE — SIGNIFICANT CHANGE UP
LACTATE BLDV-MCNC: 0.8 MMOL/L — SIGNIFICANT CHANGE UP (ref 0.5–2)
LEUKOCYTE ESTERASE UR-ACNC: NEGATIVE — SIGNIFICANT CHANGE UP
LIDOCAIN IGE QN: 55 U/L — SIGNIFICANT CHANGE UP (ref 7–60)
LYMPHOCYTES # BLD AUTO: 0.85 K/UL — LOW (ref 1–3.3)
LYMPHOCYTES # BLD AUTO: 11.5 % — LOW (ref 13–44)
MCHC RBC-ENTMCNC: 30.6 PG — SIGNIFICANT CHANGE UP (ref 27–34)
MCHC RBC-ENTMCNC: 32.3 GM/DL — SIGNIFICANT CHANGE UP (ref 32–36)
MCV RBC AUTO: 94.8 FL — SIGNIFICANT CHANGE UP (ref 80–100)
MONOCYTES # BLD AUTO: 0.4 K/UL — SIGNIFICANT CHANGE UP (ref 0–0.9)
MONOCYTES NFR BLD AUTO: 5.4 % — SIGNIFICANT CHANGE UP (ref 2–14)
NEUTROPHILS # BLD AUTO: 6.06 K/UL — SIGNIFICANT CHANGE UP (ref 1.8–7.4)
NEUTROPHILS NFR BLD AUTO: 81.8 % — HIGH (ref 43–77)
NITRITE UR-MCNC: NEGATIVE — SIGNIFICANT CHANGE UP
NRBC # BLD: 0 /100 WBCS — SIGNIFICANT CHANGE UP
NRBC # FLD: 0 K/UL — SIGNIFICANT CHANGE UP
PCO2 BLDV: 52 MMHG — HIGH (ref 41–51)
PH BLDV: 7.31 — LOW (ref 7.32–7.43)
PH UR: 6 — SIGNIFICANT CHANGE UP (ref 5–8)
PLATELET # BLD AUTO: 187 K/UL — SIGNIFICANT CHANGE UP (ref 150–400)
PO2 BLDV: <24 MMHG — LOW (ref 35–40)
POTASSIUM BLDV-SCNC: 4 MMOL/L — SIGNIFICANT CHANGE UP (ref 3.4–4.5)
POTASSIUM SERPL-MCNC: 4 MMOL/L — SIGNIFICANT CHANGE UP (ref 3.5–5.3)
POTASSIUM SERPL-SCNC: 4 MMOL/L — SIGNIFICANT CHANGE UP (ref 3.5–5.3)
PROT SERPL-MCNC: 7.5 G/DL — SIGNIFICANT CHANGE UP (ref 6–8.3)
PROT UR-MCNC: ABNORMAL
PROTHROM AB SERPL-ACNC: 11.9 SEC — SIGNIFICANT CHANGE UP (ref 10.6–13.6)
RBC # BLD: 3.43 M/UL — LOW (ref 4.2–5.8)
RBC # FLD: 13.1 % — SIGNIFICANT CHANGE UP (ref 10.3–14.5)
RBC CASTS # UR COMP ASSIST: 5 /HPF — HIGH (ref 0–4)
RH IG SCN BLD-IMP: POSITIVE — SIGNIFICANT CHANGE UP
SAO2 % BLDV: 27.1 % — LOW (ref 60–85)
SARS-COV-2 RNA SPEC QL NAA+PROBE: SIGNIFICANT CHANGE UP
SODIUM SERPL-SCNC: 136 MMOL/L — SIGNIFICANT CHANGE UP (ref 135–145)
SP GR SPEC: 1.02 — SIGNIFICANT CHANGE UP (ref 1.01–1.02)
UROBILINOGEN FLD QL: SIGNIFICANT CHANGE UP
WBC # BLD: 7.41 K/UL — SIGNIFICANT CHANGE UP (ref 3.8–10.5)
WBC # FLD AUTO: 7.41 K/UL — SIGNIFICANT CHANGE UP (ref 3.8–10.5)
WBC UR QL: 3 /HPF — SIGNIFICANT CHANGE UP (ref 0–5)

## 2021-05-01 PROCEDURE — 71046 X-RAY EXAM CHEST 2 VIEWS: CPT | Mod: 26

## 2021-05-01 PROCEDURE — 99285 EMERGENCY DEPT VISIT HI MDM: CPT | Mod: CS

## 2021-05-01 PROCEDURE — 99285 EMERGENCY DEPT VISIT HI MDM: CPT | Mod: GC

## 2021-05-01 PROCEDURE — 71250 CT THORAX DX C-: CPT | Mod: 26,MA

## 2021-05-01 PROCEDURE — 74177 CT ABD & PELVIS W/CONTRAST: CPT | Mod: 26

## 2021-05-01 RX ORDER — SODIUM CHLORIDE 9 MG/ML
1000 INJECTION INTRAMUSCULAR; INTRAVENOUS; SUBCUTANEOUS
Refills: 0 | Status: DISCONTINUED | OUTPATIENT
Start: 2021-05-01 | End: 2021-05-04

## 2021-05-01 RX ORDER — ACETAMINOPHEN 500 MG
650 TABLET ORAL ONCE
Refills: 0 | Status: COMPLETED | OUTPATIENT
Start: 2021-05-01 | End: 2021-05-01

## 2021-05-01 RX ORDER — MORPHINE SULFATE 50 MG/1
2 CAPSULE, EXTENDED RELEASE ORAL ONCE
Refills: 0 | Status: DISCONTINUED | OUTPATIENT
Start: 2021-05-01 | End: 2021-05-01

## 2021-05-01 RX ADMIN — SODIUM CHLORIDE 125 MILLILITER(S): 9 INJECTION INTRAMUSCULAR; INTRAVENOUS; SUBCUTANEOUS at 13:19

## 2021-05-01 RX ADMIN — MORPHINE SULFATE 2 MILLIGRAM(S): 50 CAPSULE, EXTENDED RELEASE ORAL at 17:05

## 2021-05-01 NOTE — ED PROVIDER NOTE - CLINICAL SUMMARY MEDICAL DECISION MAKING FREE TEXT BOX
81 year old male with PMH of CAD (+pacemaker) presents to the ED complaining abdominal pain for 2 days. HD stable. Abdomen soft with tenderness to RUQ/RLQ. Imp: Right sided abdominal pain concerning for acute appendicitis vs an acute biliary pathology. Plan for labs, CT A/P, analgesics, IV hydration, reassess.

## 2021-05-01 NOTE — ED ADULT NURSE NOTE - OBJECTIVE STATEMENT
PT AOx4, ambulatory c/o RLQ and RUQ pain started 2 days ago with worsening today. Pt states pain feel "sharp stabbing" and is constant. pt abd soft and tender. pt had 3 episodes of vomiting this morning but not nauseas now. pt denies diarrhea, chest pain, SOB, fever, chills. 18g placed in right ac, labs sent.

## 2021-05-01 NOTE — ED ADULT TRIAGE NOTE - CHIEF COMPLAINT QUOTE
abd pains x 3 days. vomited x1 this am.. states smaller amts urine , decreased appetite. abd pains x 3 days. vomited x1 this am.. states smaller amts urine , decreased appetite.  renaldo ch pains.

## 2021-05-01 NOTE — ED ADULT NURSE NOTE - CHIEF COMPLAINT QUOTE
abd pains x 3 days. vomited x1 this am.. states smaller amts urine , decreased appetite.  renaldo ch pains.

## 2021-05-01 NOTE — CONSULT NOTE ADULT - ASSESSMENT
82 y/o male with isolated right 9-11th rib fractures    - Patient has isolated traumatic findings and will need transfer to [facility] for evaluation by Trauma Surgery Service.  -Patient is stable for transportation  -Case was discussed with General Surgeon on call at [facility]Dr. Schultz  -Surgery team at [facility] called and notified of patient's transfer
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
## 2021-05-01 NOTE — ED PROVIDER NOTE - PROGRESS NOTE DETAILS
RICKYATEL: pt signed out to me by Dr. Eller, awaiting CT abd/pelvis results, pt found to have new 9th and 11th rib fx, 10th rib fracture from 2 weeks ago, discussed with radiology these are new, pt reevaluated c/o pain and nausea, pt given pain medication and surgery consult called [facility]: surgery wants pt to be transferred to [facility] for trauma consult, pt is unsure of wanting to go on the ambulance, he will speak to family, transfer center aware

## 2021-05-01 NOTE — ED PROVIDER NOTE - OBJECTIVE STATEMENT
81 year old male with PMH of CAD (+pacemaker) presents to the ED complaining abdominal pain for 2 days. Pt describes pain starting from RUQ radiating to the RLQ, sharp, constant, no relieving or aggravating factors. Pt reports associated nausea and 3 episodes of non-bloody non-bilious vomiting. Denies diarrhea, constipation, melena, hematochezia, fevers and chills. Also denies chest pain, dyspnea, palpitations, dizziness, weakness, numbness or tingling sensation. Denies any other complaints. Of note, pt states he fell a few weeks ago and had sustained a fracture of the right 10th rib, but this pain had improved.

## 2021-05-01 NOTE — ED ADULT NURSE NOTE - NSIMPLEMENTINTERV_GEN_ALL_ED
Implemented All Fall with Harm Risk Interventions:  [place] to call system. Call bell, personal items and telephone within reach. Instruct patient to call for assistance. Room bathroom lighting operational. Non-slip footwear when patient is off stretcher. Physically safe environment: no spills, clutter or unnecessary equipment. Stretcher in lowest position, wheels locked, appropriate side rails in place. Provide visual cue, wrist band, yellow gown, etc. Monitor gait and stability. Monitor for mental status changes and reorient to person, place, and time. Review medications for side effects contributing to fall risk. Reinforce activity limits and safety measures with patient and family. Provide visual clues: red socks.

## 2021-05-01 NOTE — ED PROVIDER NOTE - ATTENDING CONTRIBUTION TO CARE
81M with pmh bradycardia s/p PPM, hypothyroidism presenting with 2 days of diffuse right sided abd pain with associated nausea and vomiting. No hx abdominal surgeries. No fever, chills, cp, sob, diarrhea, dysuria, headache, weakness, numbness    GEN: NAD, awake, well appearing  HEENT: NCAT, MMM, normal conjunctiva, perrl  CHEST/LUNGS: Non-tachypneic, CTAB, bilateral breath sounds  CARDIAC: Non-tachycardic, s1s2, normal perfusion, no peripheral edema  ABDOMEN: Soft, right sided abdominal tenderness RLQ>RUQ, No rebound/guarding  MSK: No joint tenderness, no gross deformity of extremities  SKIN: No rashes, no petechiae, no vesicles  NEURO: CN grossly intact, normal coordination, no focal motor or sensory deficits  PSYCH: Alert, appropriate, cooperative     Patient presenting right sided abdominal pain with tenderness and associated nausea and vomiting. DDx includes appendicitis, colitis, cholecystitis. Screening labs and CT to assess for etiology of pain.

## 2021-05-01 NOTE — ED ADULT NURSE NOTE - NSIMPLEMENTINTERV_GEN_ALL_ED
Implemented All Fall Risk Interventions:  [place] to call system. Call bell, personal items and telephone within reach. Instruct patient to call for assistance. Room bathroom lighting operational. Non-slip footwear when patient is off stretcher. Physically safe environment: no spills, clutter or unnecessary equipment. Stretcher in lowest position, wheels locked, appropriate side rails in place. Provide visual cue, wrist band, yellow gown, etc. Monitor gait and stability. Monitor for mental status changes and reorient to person, place, and time. Review medications for side effects contributing to fall risk. Reinforce activity limits and safety measures with patient and family.
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
## 2021-05-02 DIAGNOSIS — I50.22 CHRONIC SYSTOLIC (CONGESTIVE) HEART FAILURE: ICD-10-CM

## 2021-05-02 DIAGNOSIS — Z02.9 ENCOUNTER FOR ADMINISTRATIVE EXAMINATIONS, UNSPECIFIED: ICD-10-CM

## 2021-05-02 DIAGNOSIS — R10.9 UNSPECIFIED ABDOMINAL PAIN: ICD-10-CM

## 2021-05-02 DIAGNOSIS — Z29.9 ENCOUNTER FOR PROPHYLACTIC MEASURES, UNSPECIFIED: ICD-10-CM

## 2021-05-02 DIAGNOSIS — N32.89 OTHER SPECIFIED DISORDERS OF BLADDER: ICD-10-CM

## 2021-05-02 DIAGNOSIS — S22.49XA MULTIPLE FRACTURES OF RIBS, UNSPECIFIED SIDE, INITIAL ENCOUNTER FOR CLOSED FRACTURE: ICD-10-CM

## 2021-05-02 DIAGNOSIS — I10 ESSENTIAL (PRIMARY) HYPERTENSION: ICD-10-CM

## 2021-05-02 DIAGNOSIS — N17.9 ACUTE KIDNEY FAILURE, UNSPECIFIED: ICD-10-CM

## 2021-05-02 LAB
ALBUMIN SERPL ELPH-MCNC: 3.4 G/DL — SIGNIFICANT CHANGE UP (ref 3.3–5)
ALP SERPL-CCNC: 57 U/L — SIGNIFICANT CHANGE UP (ref 40–120)
ALT FLD-CCNC: 10 U/L — SIGNIFICANT CHANGE UP (ref 10–45)
ANION GAP SERPL CALC-SCNC: 12 MMOL/L — SIGNIFICANT CHANGE UP (ref 5–17)
APPEARANCE UR: CLEAR — SIGNIFICANT CHANGE UP
AST SERPL-CCNC: 16 U/L — SIGNIFICANT CHANGE UP (ref 10–40)
BACTERIA # UR AUTO: NEGATIVE — SIGNIFICANT CHANGE UP
BILIRUB SERPL-MCNC: 0.2 MG/DL — SIGNIFICANT CHANGE UP (ref 0.2–1.2)
BILIRUB UR-MCNC: NEGATIVE — SIGNIFICANT CHANGE UP
BUN SERPL-MCNC: 19 MG/DL — SIGNIFICANT CHANGE UP (ref 7–23)
CALCIUM SERPL-MCNC: 8.5 MG/DL — SIGNIFICANT CHANGE UP (ref 8.4–10.5)
CHLORIDE SERPL-SCNC: 107 MMOL/L — SIGNIFICANT CHANGE UP (ref 96–108)
CK SERPL-CCNC: 223 U/L — HIGH (ref 30–200)
CO2 SERPL-SCNC: 21 MMOL/L — LOW (ref 22–31)
COLOR SPEC: COLORLESS — SIGNIFICANT CHANGE UP
CREAT ?TM UR-MCNC: 33 MG/DL — SIGNIFICANT CHANGE UP
CREAT SERPL-MCNC: 1.28 MG/DL — SIGNIFICANT CHANGE UP (ref 0.5–1.3)
DIFF PNL FLD: NEGATIVE — SIGNIFICANT CHANGE UP
EPI CELLS # UR: 0 /HPF — SIGNIFICANT CHANGE UP
GLUCOSE SERPL-MCNC: 83 MG/DL — SIGNIFICANT CHANGE UP (ref 70–99)
GLUCOSE UR QL: NEGATIVE — SIGNIFICANT CHANGE UP
HCT VFR BLD CALC: 27.9 % — LOW (ref 39–50)
HGB BLD-MCNC: 9.2 G/DL — LOW (ref 13–17)
KETONES UR-MCNC: NEGATIVE — SIGNIFICANT CHANGE UP
LEUKOCYTE ESTERASE UR-ACNC: NEGATIVE — SIGNIFICANT CHANGE UP
MAGNESIUM SERPL-MCNC: 1.5 MG/DL — LOW (ref 1.6–2.6)
MCHC RBC-ENTMCNC: 31.2 PG — SIGNIFICANT CHANGE UP (ref 27–34)
MCHC RBC-ENTMCNC: 33 GM/DL — SIGNIFICANT CHANGE UP (ref 32–36)
MCV RBC AUTO: 94.6 FL — SIGNIFICANT CHANGE UP (ref 80–100)
NITRITE UR-MCNC: NEGATIVE — SIGNIFICANT CHANGE UP
NRBC # BLD: 0 /100 WBCS — SIGNIFICANT CHANGE UP (ref 0–0)
PH UR: 6 — SIGNIFICANT CHANGE UP (ref 5–8)
PHOSPHATE SERPL-MCNC: 2.1 MG/DL — LOW (ref 2.5–4.5)
PLATELET # BLD AUTO: 155 K/UL — SIGNIFICANT CHANGE UP (ref 150–400)
POTASSIUM SERPL-MCNC: 3.7 MMOL/L — SIGNIFICANT CHANGE UP (ref 3.5–5.3)
POTASSIUM SERPL-SCNC: 3.7 MMOL/L — SIGNIFICANT CHANGE UP (ref 3.5–5.3)
PROT SERPL-MCNC: 6 G/DL — SIGNIFICANT CHANGE UP (ref 6–8.3)
PROT UR-MCNC: NEGATIVE — SIGNIFICANT CHANGE UP
RBC # BLD: 2.95 M/UL — LOW (ref 4.2–5.8)
RBC # FLD: 13.2 % — SIGNIFICANT CHANGE UP (ref 10.3–14.5)
RBC CASTS # UR COMP ASSIST: 3 /HPF — SIGNIFICANT CHANGE UP (ref 0–4)
SODIUM SERPL-SCNC: 140 MMOL/L — SIGNIFICANT CHANGE UP (ref 135–145)
SODIUM UR-SCNC: 98 MMOL/L — SIGNIFICANT CHANGE UP
SP GR SPEC: 1.02 — SIGNIFICANT CHANGE UP (ref 1.01–1.02)
UROBILINOGEN FLD QL: NEGATIVE — SIGNIFICANT CHANGE UP
WBC # BLD: 4.62 K/UL — SIGNIFICANT CHANGE UP (ref 3.8–10.5)
WBC # FLD AUTO: 4.62 K/UL — SIGNIFICANT CHANGE UP (ref 3.8–10.5)
WBC UR QL: 1 /HPF — SIGNIFICANT CHANGE UP (ref 0–5)

## 2021-05-02 PROCEDURE — 12345: CPT | Mod: NC

## 2021-05-02 PROCEDURE — 99223 1ST HOSP IP/OBS HIGH 75: CPT | Mod: GC

## 2021-05-02 RX ORDER — LEVOTHYROXINE SODIUM 125 MCG
1 TABLET ORAL
Qty: 0 | Refills: 0 | DISCHARGE

## 2021-05-02 RX ORDER — SODIUM CHLORIDE 9 MG/ML
500 INJECTION INTRAMUSCULAR; INTRAVENOUS; SUBCUTANEOUS ONCE
Refills: 0 | Status: COMPLETED | OUTPATIENT
Start: 2021-05-02 | End: 2021-05-02

## 2021-05-02 RX ORDER — CARVEDILOL PHOSPHATE 80 MG/1
1 CAPSULE, EXTENDED RELEASE ORAL
Qty: 0 | Refills: 0 | DISCHARGE

## 2021-05-02 RX ORDER — HEPARIN SODIUM 5000 [USP'U]/ML
5000 INJECTION INTRAVENOUS; SUBCUTANEOUS EVERY 8 HOURS
Refills: 0 | Status: DISCONTINUED | OUTPATIENT
Start: 2021-05-02 | End: 2021-05-05

## 2021-05-02 RX ORDER — ACETAMINOPHEN 500 MG
650 TABLET ORAL EVERY 6 HOURS
Refills: 0 | Status: DISCONTINUED | OUTPATIENT
Start: 2021-05-02 | End: 2021-05-05

## 2021-05-02 RX ORDER — SODIUM CHLORIDE 9 MG/ML
500 INJECTION, SOLUTION INTRAVENOUS ONCE
Refills: 0 | Status: COMPLETED | OUTPATIENT
Start: 2021-05-02 | End: 2021-05-02

## 2021-05-02 RX ORDER — AMLODIPINE BESYLATE 2.5 MG/1
1 TABLET ORAL
Qty: 0 | Refills: 0 | DISCHARGE

## 2021-05-02 RX ORDER — DONEPEZIL HYDROCHLORIDE 10 MG/1
2 TABLET, FILM COATED ORAL
Qty: 0 | Refills: 0 | DISCHARGE

## 2021-05-02 RX ORDER — PANTOPRAZOLE SODIUM 20 MG/1
40 TABLET, DELAYED RELEASE ORAL DAILY
Refills: 0 | Status: DISCONTINUED | OUTPATIENT
Start: 2021-05-02 | End: 2021-05-04

## 2021-05-02 RX ORDER — LEVOTHYROXINE SODIUM 125 MCG
100 TABLET ORAL DAILY
Refills: 0 | Status: DISCONTINUED | OUTPATIENT
Start: 2021-05-02 | End: 2021-05-05

## 2021-05-02 RX ORDER — SUCRALFATE 1 G
1 TABLET ORAL
Refills: 0 | Status: DISCONTINUED | OUTPATIENT
Start: 2021-05-02 | End: 2021-05-05

## 2021-05-02 RX ORDER — TAMSULOSIN HYDROCHLORIDE 0.4 MG/1
1 CAPSULE ORAL
Qty: 0 | Refills: 0 | DISCHARGE

## 2021-05-02 RX ORDER — LIDOCAINE 4 G/100G
1 CREAM TOPICAL EVERY 24 HOURS
Refills: 0 | Status: DISCONTINUED | OUTPATIENT
Start: 2021-05-02 | End: 2021-05-05

## 2021-05-02 RX ORDER — TRAMADOL HYDROCHLORIDE 50 MG/1
25 TABLET ORAL EVERY 6 HOURS
Refills: 0 | Status: DISCONTINUED | OUTPATIENT
Start: 2021-05-02 | End: 2021-05-04

## 2021-05-02 RX ORDER — OXYCODONE HYDROCHLORIDE 5 MG/1
5 TABLET ORAL EVERY 8 HOURS
Refills: 0 | Status: DISCONTINUED | OUTPATIENT
Start: 2021-05-02 | End: 2021-05-04

## 2021-05-02 RX ORDER — TAMSULOSIN HYDROCHLORIDE 0.4 MG/1
0.4 CAPSULE ORAL AT BEDTIME
Refills: 0 | Status: DISCONTINUED | OUTPATIENT
Start: 2021-05-02 | End: 2021-05-05

## 2021-05-02 RX ORDER — ACETAMINOPHEN 500 MG
650 TABLET ORAL EVERY 6 HOURS
Refills: 0 | Status: DISCONTINUED | OUTPATIENT
Start: 2021-05-02 | End: 2021-05-02

## 2021-05-02 RX ORDER — CARVEDILOL PHOSPHATE 80 MG/1
12.5 CAPSULE, EXTENDED RELEASE ORAL EVERY 12 HOURS
Refills: 0 | Status: DISCONTINUED | OUTPATIENT
Start: 2021-05-02 | End: 2021-05-05

## 2021-05-02 RX ORDER — DONEPEZIL HYDROCHLORIDE 10 MG/1
1 TABLET, FILM COATED ORAL
Qty: 0 | Refills: 0 | DISCHARGE

## 2021-05-02 RX ORDER — FAMOTIDINE 10 MG/ML
1 INJECTION INTRAVENOUS
Qty: 0 | Refills: 0 | DISCHARGE

## 2021-05-02 RX ORDER — PANTOPRAZOLE SODIUM 20 MG/1
40 TABLET, DELAYED RELEASE ORAL DAILY
Refills: 0 | Status: DISCONTINUED | OUTPATIENT
Start: 2021-05-02 | End: 2021-05-02

## 2021-05-02 RX ORDER — AMLODIPINE BESYLATE 2.5 MG/1
5 TABLET ORAL DAILY
Refills: 0 | Status: DISCONTINUED | OUTPATIENT
Start: 2021-05-02 | End: 2021-05-05

## 2021-05-02 RX ORDER — ATORVASTATIN CALCIUM 80 MG/1
20 TABLET, FILM COATED ORAL AT BEDTIME
Refills: 0 | Status: DISCONTINUED | OUTPATIENT
Start: 2021-05-02 | End: 2021-05-05

## 2021-05-02 RX ADMIN — SODIUM CHLORIDE 500 MILLILITER(S): 9 INJECTION, SOLUTION INTRAVENOUS at 04:02

## 2021-05-02 RX ADMIN — Medication 1 GRAM(S): at 16:23

## 2021-05-02 RX ADMIN — CARVEDILOL PHOSPHATE 12.5 MILLIGRAM(S): 80 CAPSULE, EXTENDED RELEASE ORAL at 17:32

## 2021-05-02 RX ADMIN — SODIUM CHLORIDE 1000 MILLILITER(S): 9 INJECTION INTRAMUSCULAR; INTRAVENOUS; SUBCUTANEOUS at 00:52

## 2021-05-02 RX ADMIN — PANTOPRAZOLE SODIUM 40 MILLIGRAM(S): 20 TABLET, DELAYED RELEASE ORAL at 04:02

## 2021-05-02 RX ADMIN — LIDOCAINE 1 PATCH: 4 CREAM TOPICAL at 21:30

## 2021-05-02 RX ADMIN — CARVEDILOL PHOSPHATE 12.5 MILLIGRAM(S): 80 CAPSULE, EXTENDED RELEASE ORAL at 05:27

## 2021-05-02 RX ADMIN — ATORVASTATIN CALCIUM 20 MILLIGRAM(S): 80 TABLET, FILM COATED ORAL at 21:29

## 2021-05-02 RX ADMIN — HEPARIN SODIUM 5000 UNIT(S): 5000 INJECTION INTRAVENOUS; SUBCUTANEOUS at 05:27

## 2021-05-02 RX ADMIN — TAMSULOSIN HYDROCHLORIDE 0.4 MILLIGRAM(S): 0.4 CAPSULE ORAL at 21:33

## 2021-05-02 RX ADMIN — AMLODIPINE BESYLATE 5 MILLIGRAM(S): 2.5 TABLET ORAL at 05:27

## 2021-05-02 RX ADMIN — LIDOCAINE 1 PATCH: 4 CREAM TOPICAL at 19:40

## 2021-05-02 RX ADMIN — Medication 650 MILLIGRAM(S): at 12:55

## 2021-05-02 RX ADMIN — Medication 100 MICROGRAM(S): at 05:27

## 2021-05-02 RX ADMIN — HEPARIN SODIUM 5000 UNIT(S): 5000 INJECTION INTRAVENOUS; SUBCUTANEOUS at 21:26

## 2021-05-02 RX ADMIN — HEPARIN SODIUM 5000 UNIT(S): 5000 INJECTION INTRAVENOUS; SUBCUTANEOUS at 12:56

## 2021-05-02 RX ADMIN — LIDOCAINE 1 PATCH: 4 CREAM TOPICAL at 09:56

## 2021-05-02 RX ADMIN — PANTOPRAZOLE SODIUM 40 MILLIGRAM(S): 20 TABLET, DELAYED RELEASE ORAL at 12:54

## 2021-05-02 RX ADMIN — Medication 650 MILLIGRAM(S): at 17:33

## 2021-05-02 NOTE — H&P ADULT - NSICDXFAMILYHX_GEN_ALL_CORE_FT
FAMILY HISTORY:  Sibling  Still living? Unknown  FH: heart disease, Age at diagnosis: Age Unknown

## 2021-05-02 NOTE — ED PROVIDER NOTE - OBJECTIVE STATEMENT
81 year old male with PMH of CAD (+pacemaker) presenting with multiple rib fractures. He presented to [facility] for RUQ abdominal pain, had fallen ~2weeks prior was diagnosed with 10th rib fracture. Found to have fractures in right ribs 9-11 today, transferred for trauma consult.

## 2021-05-02 NOTE — H&P ADULT - PROBLEM SELECTOR PLAN 6
Bladder wall thickening noted on CT  -Urine cytology  -Outpatient urology  -No hydro on CT  -If CINDY does not improve or is continually retaining clinically, then urology consult Bladder wall thickening noted on CT  -Urine cytology  -Consider urology consult in AM  -No hydro on CT  -If CINDY does not improve or is continually retaining clinically, then urology consult Bladder wall thickening noted on CT  -Consider urology consult in AM  -No hydro on CT  -If CINDY does not improve or is continually retaining clinically, then urology consult

## 2021-05-02 NOTE — H&P ADULT - ASSESSMENT
81 M dilated cardiomyopathy, AV biventricular pacemaker, and hypothyroidism presenting with 3 right rib fractures and acute kidney injury.
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
## 2021-05-02 NOTE — H&P ADULT - ATTENDING COMMENTS
81 M dilated cardiomyopathy, AV biventricular pacemaker, and hypothyroidism p/w 3 right rib fractures and acute kidney injury also with suspected gastritis in setting of NSAID use. Discussed NSAID avoidance with patient and to take medications we Rx limited to amount we recommend. Pt stating epigastric pain with eating is the most significant  -Pain control with tylenol and oxycodone for now  -PPI and antacids  -Trend BMP and hgb  -F/u CINDY work up  -Cont. other home meds  -discharge planning  -Consider urology consult, maybe outpatient  -DVT PPx, SCDs

## 2021-05-02 NOTE — CHART NOTE - NSCHARTNOTEFT_GEN_A_CORE
Discussed with patient regarding [employer] COVID vaccine.   As per patient, they have already received 2 doses of Pfizer in  2021. Thus, patient does not qualify for J&J vaccine      Rosa Decker PA-C  #[id number]

## 2021-05-02 NOTE — H&P ADULT - NSHPPHYSICALEXAM_GEN_ALL_CORE
PHYSICAL EXAM:    Vital Signs Last 24 Hrs  T(C): 37 (02 May 2021 02:50), Max: 37 (02 May 2021 02:50)  T(F): 98.6 (02 May 2021 02:50), Max: 98.6 (02 May 2021 02:50)  HR: 62 (02 May 2021 02:50) (62 - 78)  BP: 156/72 (02 May 2021 02:50) (135/66 - 186/79)  BP(mean): --  RR: 18 (02 May 2021 02:50) (16 - 20)  SpO2: 100% (02 May 2021 02:50) (100% - 100%)    General: No acute distress. Standing up, walking around room. Well dressed  HEENT: NCAT.  PERRL.  EOMI. Dry mucous membranes.  Neck: Supple.  Full ROM.  No JVD.  No thyromegaly. No lymphadenopathy.   Heart: RRR.  Normal S1 and S2.  No murmurs, rubs, or gallops.   Lungs: CTAB. No wheezes, crackles, or rhonchi.    Abdomen: Mild RUQ tenderness, no epigastric tenderness.  Skin: Warm and dry.  No rashes.  Extremities: No edema, clubbing, or cyanosis.  2+ peripheral pulses b/l.  Musculoskeletal: No deformities.  No spinal or paraspinal tenderness.  Psych: Normal mood, affect  Neuro: A&Ox3.  MAEx4.

## 2021-05-02 NOTE — ED PROVIDER NOTE - PROGRESS NOTE DETAILS
Attending note (Dariel): patient getting additional iv fluid; CT chest re-demonstrates 3 rib fractures and evidence of b/l striated nephrograms, suggesting ATN kailee in setting of CINDY (Cr 1.5, baseline ~1); will plan for admission for pain control, for CINDY and further evaluation of ATN.  At present afebrile and not c/w ascending UTI based on overall assessment, review of UA from prior visit at [facility].

## 2021-05-02 NOTE — H&P ADULT - PROBLEM SELECTOR PLAN 3
Acute kidney injury: Pre-renal (low po, celecoxib 200 once to twice per day) v ATN  -F/u FeNA  -Additional 500 mL LR fluids now Acute kidney injury: Pre-renal (low po, celecoxib 200 once to twice per day) v ATN (bilateral striated nephrogram on CT)  -F/u FeNA  -Additional 500 mL LR fluids now  -Bladder scan post void  -Tamsulosin 0.4

## 2021-05-02 NOTE — H&P ADULT - PROBLEM SELECTOR PLAN 1
Traumatic rib fractures in ribs 9 through 11 following mechanical fall  -Incentive spirometry  -Pain control with tylenol and oxycodone

## 2021-05-02 NOTE — H&P ADULT - PROBLEM SELECTOR PLAN 4
Patient reports ef 36%, no echo on record  -C/w home meds: coreg 12.5 BID  -C/w statin  -Judicious IVF iso CINDY

## 2021-05-02 NOTE — H&P ADULT - NSHPLABSRESULTS_GEN_ALL_CORE
LABS:                          10.5   7.41  )-----------( 187      ( 01 May 2021 13:11 )             32.5       05-    136  |  101  |  33<H>  ----------------------------<  105<H>  4.0   |  24  |  1.54<H>    Ca    9.1      01 May 2021 13:11    TPro  7.5  /  Alb  4.5  /  TBili  0.2  /  DBili  x   /  AST  18  /  ALT  10  /  AlkPhos  78  05-01       LIVER FUNCTIONS - ( 01 May 2021 13:11 )  Alb: 4.5 g/dL / Pro: 7.5 g/dL / ALK PHOS: 78 U/L / ALT: 10 U/L / AST: 18 U/L / GGT: x                    Urinalysis Basic - ( 02 May 2021 02:10 )    Color: Colorless / Appearance: Clear / S.024 / pH: x  Gluc: x / Ketone: Negative  / Bili: Negative / Urobili: Negative   Blood: x / Protein: Negative / Nitrite: Negative   Leuk Esterase: Negative / RBC: 3 /hpf / WBC 1 /HPF   Sq Epi: x / Non Sq Epi: 0 /hpf / Bacteria: Negative        PT/INR - ( 01 May 2021 13:11 )   PT: 11.9 sec;   INR: 1.05 ratio         PTT - ( 01 May 2021 13:11 )  PTT:31.2 sec    Lactate Trend            CAPILLARY BLOOD GLUCOSE                  RADIOLOGY & ADDITIONAL TESTS: Reviewed  EKG pending  EKG  - AV paced at rate of 69 with   CT chest: R rib fractures 9 through 11  CT abd: Striations in kidney c/w ATN    Labs and imaging personally reviewed LABS:                        10.5   7.41  )-----------( 187      ( 01 May 2021 13:11 )             32.5     05-    136  |  101  |  33<H>  ----------------------------<  105<H>  4.0   |  24  |  1.54<H>    Ca    9.1      01 May 2021 13:11    TPro  7.5  /  Alb  4.5  /  TBili  0.2  /  DBili  x   /  AST  18  /  ALT  10  /  AlkPhos  78  05-01     LIVER FUNCTIONS - ( 01 May 2021 13:11 )  Alb: 4.5 g/dL / Pro: 7.5 g/dL / ALK PHOS: 78 U/L / ALT: 10 U/L / AST: 18 U/L / GGT: x              Urinalysis Basic - ( 02 May 2021 02:10 )    Color: Colorless / Appearance: Clear / S.024 / pH: x  Gluc: x / Ketone: Negative  / Bili: Negative / Urobili: Negative   Blood: x / Protein: Negative / Nitrite: Negative   Leuk Esterase: Negative / RBC: 3 /hpf / WBC 1 /HPF   Sq Epi: x / Non Sq Epi: 0 /hpf / Bacteria: Negative    PT/INR - ( 01 May 2021 13:11 )   PT: 11.9 sec;   INR: 1.05 ratio      PTT - ( 01 May 2021 13:11 )  PTT:31.2 sec    Lactate Trend  CAPILLARY BLOOD GLUCOSE    RADIOLOGY & ADDITIONAL TESTS: Reviewed  EKG pending  EKG  - AV paced at rate of 69 with   CT chest: R rib fractures 9 through 11  CT abd: Striations in kidney c/w ATN    Labs and imaging personally reviewed

## 2021-05-02 NOTE — H&P ADULT - NSICDXPASTMEDICALHX_GEN_ALL_CORE_FT
PAST MEDICAL HISTORY:  Bradycardia     Chronic systolic congestive heart failure     HTN (hypertension)     Hypothyroidism

## 2021-05-02 NOTE — H&P ADULT - PROBLEM SELECTOR PLAN 2
Image negative abdominal pain with RUQ tenderness to palpation but also worse with deep breaths. DDX: ulcer or gastritis from celecoxib, unlikely biliary given normal imaging CMP, referred pain from fracture  -Protonix 40 IV daily, carafate PRN  -Monitor CBC  -Hold NSAID  -Pain control for rib fx as above  -If does not improve with above, then consider GI c/s. Image negative abdominal pain with RUQ tenderness to palpation but also worse with deep breaths. DDX: ulcer or gastritis from celecoxib, unlikely biliary given normal imaging CMP, referred pain from fracture  -Protonix 40 IV daily, maalox PRN  -Monitor CBC  -Hold NSAID  -Pain control for rib fx as above  -If does not improve with above, then consider GI c/s.

## 2021-05-02 NOTE — ED PROVIDER NOTE - ATTENDING CONTRIBUTION TO CARE
Attending Statement (LEONARD Vieira MD):    HPI: 80y/o M with h/o CAD, bradycardia (has PPM)., hypothyroidism, presenting to [facility] ED for evaluation of right sided upper abdominal pain; found on CT A/P to have acute minimally displaced fracture of the right posterolateral ninth rib and nondisplaced fractures of the posterior 11th and 10th ribs at the costovertebral junctions.  Also noted to have Bilateral striated nephrogram with question urinary bladder wall thickening (possible infection or infarct) and incidentally questionable rectal wall thickening versus underdistention. no rectal pain reported to suggest proctitis; patient transferred to this ED for trauma surgery consult and further evaluation.  Denies difficulty breathing but reports pain in right side that is sharp, worse with breathing in.  No cough, no fever. No urinary symptoms. Denies chest pain, but when asked to localize pain indicates right lower lateral chest wall.  Patient reports fall 4/18 - evaluated in ED at that time, found to have isolated R 10th rib fracture (on CT c/a/p); discharged home; denies a falls/trauma in interim.    Review of Systems:  -General: no fever or chills  -ENT: no congestion, no difficulty swallowing  -Pulmonary: no cough, no shortness of breath  -Cardiac: see hpi, no palpitations  -Gastrointestinal: see hpi, no nausea, no vomiting, and no diarrhea.  -Genitourinary: no blood or pain with urination  -Musculoskeletal: no back or neck pain  -Skin: no rashes  -Endocrine: No h/o diabetes or thyroid disease  -Neurologic: No focal weakness or numbness    All else negative unless otherwise specified elsewhere in this note.    PSH/PMH as noted above    On Physical Exam:  General: well appearing, in NAD, speaking clearly in full sentences and without difficulty; cooperative with exam  HEENT: airway patent  Neck: no neck tenderness, no nuchal rigidity  Cardiac: normal s1, s2; RRR; no MGR  Lungs: CTABL  Abdomen: soft nontender/nondistended  Back: + R CVA tenderness  Chest: +R lateral chest wall tenderness  : no bladder tenderness or distension  Skin: intact, no rash; no chest /abdomen/back ecchymoses or abrasions noted  Extremities: no peripheral edema, no gross deformities  Neuro: no gross neurologic deficits    MDM:  R sided abd pain/ chest pain; likely related to underlying rib fractures, however will further characterize chest injuries with dedicated CT chest at recommendation of trauma team (noncontrast given age and prior contrast load, and no concern at present for acute vascular injuries); will monitor incentive spirometry; UA from OSH shows brown/turbid urine, culture running; given CT findings does raise concern for possible ascending UTI; pending review of CT consider admission for further evaluation / management. Attending Statement (LEONARD Vieira MD):    HPI: 82y/o M with h/o CAD, bradycardia (has PPM)., hypothyroidism, presenting to [facility] ED for evaluation of right sided upper abdominal pain; found on CT A/P to have acute minimally displaced fracture of the right posterolateral ninth rib and nondisplaced fractures of the posterior 11th and 10th ribs at the costovertebral junctions.  Also noted to have Bilateral striated nephrogram with question urinary bladder wall thickening (possible infection or infarct) and incidentally questionable rectal wall thickening versus underdistention. no rectal pain reported to suggest proctitis; patient transferred to this ED for trauma surgery consult and further evaluation.  Denies difficulty breathing but reports pain in right side that is sharp, worse with breathing in.  No cough, no fever. No urinary symptoms. Denies chest pain, but when asked to localize pain indicates right lower lateral chest wall.  Patient reports fall 4/18 - evaluated in ED at that time, found to have isolated R 10th rib fracture (on CT c/a/p); discharged home; denies a falls/trauma in interim.    Review of Systems:  -General: no fever or chills  -ENT: no congestion, no difficulty swallowing  -Pulmonary: no cough, no shortness of breath  -Cardiac: see hpi, no palpitations  -Gastrointestinal: see hpi, no nausea, no vomiting, and no diarrhea.  -Genitourinary: no blood or pain with urination  -Musculoskeletal: no back or neck pain  -Skin: no rashes  -Endocrine: No h/o diabetes or thyroid disease  -Neurologic: No focal weakness or numbness    All else negative unless otherwise specified elsewhere in this note.    PSH/PMH as noted above    On Physical Exam:  General: well appearing, in NAD, speaking clearly in full sentences and without difficulty; cooperative with exam  HEENT: airway patent  Neck: no neck tenderness, no nuchal rigidity  Cardiac: normal s1, s2; RRR; no MGR  Lungs: CTABL  Abdomen: soft nontender/nondistended  Back: No CVA tenderness b/l.  Chest: +R lateral chest wall tenderness  : no bladder tenderness or distension  Skin: intact, no rash; no chest /abdomen/back ecchymoses or abrasions noted  Extremities: no peripheral edema, no gross deformities  Neuro: no gross neurologic deficits    MDM:  R sided abd pain/ chest pain; likely related to underlying rib fractures, however will further characterize chest injuries with dedicated CT chest at recommendation of trauma team (noncontrast given age and prior contrast load, and no concern at present for acute vascular injuries); will monitor incentive spirometry; UA from OSH shows brown/turbid urine, culture running; given CT findings does raise concern for possible ascending UTI; pending review of CT consider admission for further evaluation / management.

## 2021-05-02 NOTE — ED PROVIDER NOTE - PHYSICAL EXAMINATION
Gen: AAOx3, non-toxic  Head: NCAT  Lung: CTAB, no respiratory distress, speaking in full sentences  CV: RRR, no murmurs, rubs or gallops  Abd: soft, NTND, no guarding,  MSK: no visible deformities, right lower rib tenderness  Neuro: No focal sensory or motor deficits  Skin: Warm, well perfused, no rash  Psych: normal affect.   ~Pietro Juan PGY3

## 2021-05-02 NOTE — ED PROVIDER NOTE - [facility] ED ROS FT
Gen: No fever, No chills  Resp: No cough. No SOB  Cardiovascular: No chest pain. No palpitations  GI:+ abdominal pain, No nausea/vomiting, diarrhea, constipation  :  increased frequency with decreased output; no dysuria  MS: right rib pain  Skin: No rashes  Neuro: No headache; No numbness or weakness  Remainder negative, except as per the HPI

## 2021-05-02 NOTE — H&P ADULT - NSHPREVIEWOFSYSTEMS_GEN_ALL_CORE
REVIEW OF SYSTEMS:    CONSTITUTIONAL: No weakness, fevers or chills  EYES/ENT: No visual changes;  No vertigo or throat pain   NECK: No pain or stiffness  RESPIRATORY: No cough, wheezing, hemoptysis; No shortness of breath  CARDIOVASCULAR: No chest pain or palpitations  GASTROINTESTINAL: +abdominal pain; nausea, vomiting;  diarrhea or constipation. No hematemesis, melena or hematochezia.  GENITOURINARY: No dysuria, frequency or hematuria  NEUROLOGICAL: No numbness or weakness  SKIN: No itching, burning, rashes, or lesions   All other review of systems is negative unless indicated above.

## 2021-05-02 NOTE — ED PROVIDER NOTE - CLINICAL SUMMARY MEDICAL DECISION MAKING FREE TEXT BOX
81y male multiple rib fracture. Abdomen non-tender, +rib tendness, abdominal pain likely referred from ribs. Unclear reason why 2 new fractures not seen after fall, no trauma since fall. Will get ct of the chest. consult trauma. Also has abnormal kidney findings, abida, will give fluids and recheck bmp and urine.

## 2021-05-03 LAB
ANION GAP SERPL CALC-SCNC: 12 MMOL/L — SIGNIFICANT CHANGE UP (ref 5–17)
BUN SERPL-MCNC: 19 MG/DL — SIGNIFICANT CHANGE UP (ref 7–23)
CALCIUM SERPL-MCNC: 9.2 MG/DL — SIGNIFICANT CHANGE UP (ref 8.4–10.5)
CHLORIDE SERPL-SCNC: 101 MMOL/L — SIGNIFICANT CHANGE UP (ref 96–108)
CO2 SERPL-SCNC: 25 MMOL/L — SIGNIFICANT CHANGE UP (ref 22–31)
COVID-19 SPIKE DOMAIN AB INTERP: POSITIVE
COVID-19 SPIKE DOMAIN ANTIBODY RESULT: >250 U/ML — HIGH
CREAT SERPL-MCNC: 1.42 MG/DL — HIGH (ref 0.5–1.3)
GLUCOSE SERPL-MCNC: 103 MG/DL — HIGH (ref 70–99)
MAGNESIUM SERPL-MCNC: 1.5 MG/DL — LOW (ref 1.6–2.6)
POTASSIUM SERPL-MCNC: 4.4 MMOL/L — SIGNIFICANT CHANGE UP (ref 3.5–5.3)
POTASSIUM SERPL-SCNC: 4.4 MMOL/L — SIGNIFICANT CHANGE UP (ref 3.5–5.3)
SARS-COV-2 IGG+IGM SERPL QL IA: >250 U/ML — HIGH
SARS-COV-2 IGG+IGM SERPL QL IA: POSITIVE
SODIUM SERPL-SCNC: 138 MMOL/L — SIGNIFICANT CHANGE UP (ref 135–145)

## 2021-05-03 PROCEDURE — 99232 SBSQ HOSP IP/OBS MODERATE 35: CPT

## 2021-05-03 RX ORDER — SODIUM CHLORIDE 9 MG/ML
500 INJECTION, SOLUTION INTRAVENOUS
Refills: 0 | Status: COMPLETED | OUTPATIENT
Start: 2021-05-03 | End: 2021-05-04

## 2021-05-03 RX ADMIN — TAMSULOSIN HYDROCHLORIDE 0.4 MILLIGRAM(S): 0.4 CAPSULE ORAL at 21:21

## 2021-05-03 RX ADMIN — LIDOCAINE 1 PATCH: 4 CREAM TOPICAL at 12:14

## 2021-05-03 RX ADMIN — SODIUM CHLORIDE 75 MILLILITER(S): 9 INJECTION, SOLUTION INTRAVENOUS at 09:36

## 2021-05-03 RX ADMIN — HEPARIN SODIUM 5000 UNIT(S): 5000 INJECTION INTRAVENOUS; SUBCUTANEOUS at 05:35

## 2021-05-03 RX ADMIN — Medication 650 MILLIGRAM(S): at 14:22

## 2021-05-03 RX ADMIN — PANTOPRAZOLE SODIUM 40 MILLIGRAM(S): 20 TABLET, DELAYED RELEASE ORAL at 12:15

## 2021-05-03 RX ADMIN — Medication 1 GRAM(S): at 12:15

## 2021-05-03 RX ADMIN — AMLODIPINE BESYLATE 5 MILLIGRAM(S): 2.5 TABLET ORAL at 05:35

## 2021-05-03 RX ADMIN — Medication 650 MILLIGRAM(S): at 05:34

## 2021-05-03 RX ADMIN — HEPARIN SODIUM 5000 UNIT(S): 5000 INJECTION INTRAVENOUS; SUBCUTANEOUS at 14:21

## 2021-05-03 RX ADMIN — CARVEDILOL PHOSPHATE 12.5 MILLIGRAM(S): 80 CAPSULE, EXTENDED RELEASE ORAL at 05:34

## 2021-05-03 RX ADMIN — Medication 1 GRAM(S): at 00:12

## 2021-05-03 RX ADMIN — Medication 1 GRAM(S): at 05:34

## 2021-05-03 RX ADMIN — CARVEDILOL PHOSPHATE 12.5 MILLIGRAM(S): 80 CAPSULE, EXTENDED RELEASE ORAL at 17:28

## 2021-05-03 RX ADMIN — Medication 1 GRAM(S): at 17:28

## 2021-05-03 RX ADMIN — Medication 650 MILLIGRAM(S): at 00:11

## 2021-05-03 RX ADMIN — HEPARIN SODIUM 5000 UNIT(S): 5000 INJECTION INTRAVENOUS; SUBCUTANEOUS at 21:21

## 2021-05-03 RX ADMIN — LIDOCAINE 1 PATCH: 4 CREAM TOPICAL at 19:25

## 2021-05-03 RX ADMIN — ATORVASTATIN CALCIUM 20 MILLIGRAM(S): 80 TABLET, FILM COATED ORAL at 21:21

## 2021-05-03 RX ADMIN — Medication 100 MICROGRAM(S): at 05:34
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
## 2021-05-04 LAB
ANION GAP SERPL CALC-SCNC: 12 MMOL/L — SIGNIFICANT CHANGE UP (ref 5–17)
BUN SERPL-MCNC: 17 MG/DL — SIGNIFICANT CHANGE UP (ref 7–23)
CALCIUM SERPL-MCNC: 9.1 MG/DL — SIGNIFICANT CHANGE UP (ref 8.4–10.5)
CHLORIDE SERPL-SCNC: 101 MMOL/L — SIGNIFICANT CHANGE UP (ref 96–108)
CO2 SERPL-SCNC: 25 MMOL/L — SIGNIFICANT CHANGE UP (ref 22–31)
CREAT SERPL-MCNC: 1.33 MG/DL — HIGH (ref 0.5–1.3)
GLUCOSE SERPL-MCNC: 94 MG/DL — SIGNIFICANT CHANGE UP (ref 70–99)
HCT VFR BLD CALC: 31.4 % — LOW (ref 39–50)
HGB BLD-MCNC: 10 G/DL — LOW (ref 13–17)
MCHC RBC-ENTMCNC: 30.9 PG — SIGNIFICANT CHANGE UP (ref 27–34)
MCHC RBC-ENTMCNC: 31.8 GM/DL — LOW (ref 32–36)
MCV RBC AUTO: 96.9 FL — SIGNIFICANT CHANGE UP (ref 80–100)
NRBC # BLD: 0 /100 WBCS — SIGNIFICANT CHANGE UP (ref 0–0)
PLATELET # BLD AUTO: 180 K/UL — SIGNIFICANT CHANGE UP (ref 150–400)
POTASSIUM SERPL-MCNC: 4.2 MMOL/L — SIGNIFICANT CHANGE UP (ref 3.5–5.3)
POTASSIUM SERPL-SCNC: 4.2 MMOL/L — SIGNIFICANT CHANGE UP (ref 3.5–5.3)
RBC # BLD: 3.24 M/UL — LOW (ref 4.2–5.8)
RBC # FLD: 13.1 % — SIGNIFICANT CHANGE UP (ref 10.3–14.5)
SODIUM SERPL-SCNC: 138 MMOL/L — SIGNIFICANT CHANGE UP (ref 135–145)
WBC # BLD: 4.4 K/UL — SIGNIFICANT CHANGE UP (ref 3.8–10.5)
WBC # FLD AUTO: 4.4 K/UL — SIGNIFICANT CHANGE UP (ref 3.8–10.5)

## 2021-05-04 PROCEDURE — 99232 SBSQ HOSP IP/OBS MODERATE 35: CPT

## 2021-05-04 RX ORDER — PANTOPRAZOLE SODIUM 20 MG/1
40 TABLET, DELAYED RELEASE ORAL
Refills: 0 | Status: DISCONTINUED | OUTPATIENT
Start: 2021-05-04 | End: 2021-05-05

## 2021-05-04 RX ADMIN — Medication 100 MICROGRAM(S): at 05:40

## 2021-05-04 RX ADMIN — Medication 1 GRAM(S): at 00:00

## 2021-05-04 RX ADMIN — SODIUM CHLORIDE 75 MILLILITER(S): 9 INJECTION, SOLUTION INTRAVENOUS at 10:02

## 2021-05-04 RX ADMIN — Medication 1 GRAM(S): at 17:32

## 2021-05-04 RX ADMIN — Medication 1 GRAM(S): at 12:12

## 2021-05-04 RX ADMIN — LIDOCAINE 1 PATCH: 4 CREAM TOPICAL at 00:00

## 2021-05-04 RX ADMIN — LIDOCAINE 1 PATCH: 4 CREAM TOPICAL at 23:59

## 2021-05-04 RX ADMIN — AMLODIPINE BESYLATE 5 MILLIGRAM(S): 2.5 TABLET ORAL at 05:40

## 2021-05-04 RX ADMIN — PANTOPRAZOLE SODIUM 40 MILLIGRAM(S): 20 TABLET, DELAYED RELEASE ORAL at 12:11

## 2021-05-04 RX ADMIN — ATORVASTATIN CALCIUM 20 MILLIGRAM(S): 80 TABLET, FILM COATED ORAL at 22:04

## 2021-05-04 RX ADMIN — Medication 650 MILLIGRAM(S): at 12:12

## 2021-05-04 RX ADMIN — HEPARIN SODIUM 5000 UNIT(S): 5000 INJECTION INTRAVENOUS; SUBCUTANEOUS at 05:41

## 2021-05-04 RX ADMIN — TAMSULOSIN HYDROCHLORIDE 0.4 MILLIGRAM(S): 0.4 CAPSULE ORAL at 22:03

## 2021-05-04 RX ADMIN — CARVEDILOL PHOSPHATE 12.5 MILLIGRAM(S): 80 CAPSULE, EXTENDED RELEASE ORAL at 17:32

## 2021-05-04 RX ADMIN — Medication 650 MILLIGRAM(S): at 05:40

## 2021-05-04 RX ADMIN — LIDOCAINE 1 PATCH: 4 CREAM TOPICAL at 12:11

## 2021-05-04 RX ADMIN — Medication 1 GRAM(S): at 05:40

## 2021-05-04 RX ADMIN — Medication 1 GRAM(S): at 23:57

## 2021-05-04 RX ADMIN — HEPARIN SODIUM 5000 UNIT(S): 5000 INJECTION INTRAVENOUS; SUBCUTANEOUS at 22:05

## 2021-05-04 RX ADMIN — LIDOCAINE 1 PATCH: 4 CREAM TOPICAL at 19:30

## 2021-05-04 RX ADMIN — Medication 650 MILLIGRAM(S): at 17:32

## 2021-05-04 RX ADMIN — CARVEDILOL PHOSPHATE 12.5 MILLIGRAM(S): 80 CAPSULE, EXTENDED RELEASE ORAL at 05:40

## 2021-05-04 RX ADMIN — HEPARIN SODIUM 5000 UNIT(S): 5000 INJECTION INTRAVENOUS; SUBCUTANEOUS at 14:25

## 2021-05-04 NOTE — PROGRESS NOTE ADULT - PROBLEM SELECTOR PLAN 8
Should Cr continue to improve and abdominal pain resolve with current therapy hopeful for d/c home tomorrow
Should Cr continue to improve and abdominal pain resolve with current therapy hopeful for d/c home tomorrow
Should Cr continue t improve and abdominal pain resolve wit current therapy hopeful for d/c home tomorrow

## 2021-05-05 ENCOUNTER — TRANSCRIPTION ENCOUNTER (OUTPATIENT)
Age: 81
End: 2021-05-05

## 2021-05-05 VITALS
HEART RATE: 94 BPM | DIASTOLIC BLOOD PRESSURE: 73 MMHG | SYSTOLIC BLOOD PRESSURE: 131 MMHG | OXYGEN SATURATION: 100 % | TEMPERATURE: 98 F | RESPIRATION RATE: 18 BRPM

## 2021-05-05 LAB
ANION GAP SERPL CALC-SCNC: 10 MMOL/L — SIGNIFICANT CHANGE UP (ref 5–17)
BUN SERPL-MCNC: 18 MG/DL — SIGNIFICANT CHANGE UP (ref 7–23)
CALCIUM SERPL-MCNC: 8.8 MG/DL — SIGNIFICANT CHANGE UP (ref 8.4–10.5)
CHLORIDE SERPL-SCNC: 102 MMOL/L — SIGNIFICANT CHANGE UP (ref 96–108)
CO2 SERPL-SCNC: 23 MMOL/L — SIGNIFICANT CHANGE UP (ref 22–31)
CREAT SERPL-MCNC: 1.25 MG/DL — SIGNIFICANT CHANGE UP (ref 0.5–1.3)
GLUCOSE SERPL-MCNC: 92 MG/DL — SIGNIFICANT CHANGE UP (ref 70–99)
HCT VFR BLD CALC: 28.3 % — LOW (ref 39–50)
HGB BLD-MCNC: 9.3 G/DL — LOW (ref 13–17)
MCHC RBC-ENTMCNC: 31.5 PG — SIGNIFICANT CHANGE UP (ref 27–34)
MCHC RBC-ENTMCNC: 32.9 GM/DL — SIGNIFICANT CHANGE UP (ref 32–36)
MCV RBC AUTO: 95.9 FL — SIGNIFICANT CHANGE UP (ref 80–100)
NRBC # BLD: 0 /100 WBCS — SIGNIFICANT CHANGE UP (ref 0–0)
PLATELET # BLD AUTO: 159 K/UL — SIGNIFICANT CHANGE UP (ref 150–400)
POTASSIUM SERPL-MCNC: 4.3 MMOL/L — SIGNIFICANT CHANGE UP (ref 3.5–5.3)
POTASSIUM SERPL-SCNC: 4.3 MMOL/L — SIGNIFICANT CHANGE UP (ref 3.5–5.3)
RBC # BLD: 2.95 M/UL — LOW (ref 4.2–5.8)
RBC # FLD: 13.1 % — SIGNIFICANT CHANGE UP (ref 10.3–14.5)
SODIUM SERPL-SCNC: 135 MMOL/L — SIGNIFICANT CHANGE UP (ref 135–145)
WBC # BLD: 5.15 K/UL — SIGNIFICANT CHANGE UP (ref 3.8–10.5)
WBC # FLD AUTO: 5.15 K/UL — SIGNIFICANT CHANGE UP (ref 3.8–10.5)

## 2021-05-05 PROCEDURE — 82550 ASSAY OF CK (CPK): CPT

## 2021-05-05 PROCEDURE — 84100 ASSAY OF PHOSPHORUS: CPT

## 2021-05-05 PROCEDURE — 80053 COMPREHEN METABOLIC PANEL: CPT

## 2021-05-05 PROCEDURE — 86769 SARS-COV-2 COVID-19 ANTIBODY: CPT

## 2021-05-05 PROCEDURE — 83735 ASSAY OF MAGNESIUM: CPT

## 2021-05-05 PROCEDURE — 81001 URINALYSIS AUTO W/SCOPE: CPT

## 2021-05-05 PROCEDURE — 80048 BASIC METABOLIC PNL TOTAL CA: CPT

## 2021-05-05 PROCEDURE — 82570 ASSAY OF URINE CREATININE: CPT

## 2021-05-05 PROCEDURE — 85027 COMPLETE CBC AUTOMATED: CPT

## 2021-05-05 PROCEDURE — 84300 ASSAY OF URINE SODIUM: CPT

## 2021-05-05 PROCEDURE — 71250 CT THORAX DX C-: CPT

## 2021-05-05 PROCEDURE — 99285 EMERGENCY DEPT VISIT HI MDM: CPT | Mod: 25

## 2021-05-05 PROCEDURE — 99239 HOSP IP/OBS DSCHRG MGMT >30: CPT

## 2021-05-05 PROCEDURE — 99223 1ST HOSP IP/OBS HIGH 75: CPT | Mod: GC

## 2021-05-05 RX ORDER — CELECOXIB 200 MG/1
1 CAPSULE ORAL
Qty: 0 | Refills: 0 | DISCHARGE

## 2021-05-05 RX ORDER — ACETAMINOPHEN 500 MG
2 TABLET ORAL
Qty: 0 | Refills: 0 | DISCHARGE
Start: 2021-05-05

## 2021-05-05 RX ADMIN — Medication 1 GRAM(S): at 06:15

## 2021-05-05 RX ADMIN — Medication 650 MILLIGRAM(S): at 07:17

## 2021-05-05 RX ADMIN — Medication 100 MICROGRAM(S): at 06:15

## 2021-05-05 RX ADMIN — HEPARIN SODIUM 5000 UNIT(S): 5000 INJECTION INTRAVENOUS; SUBCUTANEOUS at 13:33

## 2021-05-05 RX ADMIN — Medication 1 GRAM(S): at 11:31

## 2021-05-05 RX ADMIN — LIDOCAINE 1 PATCH: 4 CREAM TOPICAL at 08:27

## 2021-05-05 RX ADMIN — AMLODIPINE BESYLATE 5 MILLIGRAM(S): 2.5 TABLET ORAL at 06:16

## 2021-05-05 RX ADMIN — Medication 650 MILLIGRAM(S): at 06:17

## 2021-05-05 RX ADMIN — Medication 650 MILLIGRAM(S): at 11:30

## 2021-05-05 RX ADMIN — HEPARIN SODIUM 5000 UNIT(S): 5000 INJECTION INTRAVENOUS; SUBCUTANEOUS at 06:16

## 2021-05-05 RX ADMIN — PANTOPRAZOLE SODIUM 40 MILLIGRAM(S): 20 TABLET, DELAYED RELEASE ORAL at 06:15

## 2021-05-05 RX ADMIN — CARVEDILOL PHOSPHATE 12.5 MILLIGRAM(S): 80 CAPSULE, EXTENDED RELEASE ORAL at 06:16

## 2021-05-05 NOTE — PROGRESS NOTE ADULT - PROBLEM SELECTOR PLAN 6
Bladder wall thickening noted on CT. UA not c/w infection  -No hydro on CT  -If CINDY does not improve or is continually retaining clinically, then urology consult
Bladder wall thickening noted on CT. UA not c/w infection  -No hydro on CT  -If CINDY does not improve or is continually retaining clinically, then urology consult
Bladder wall thickening noted on CT. UA not c/w infection  -No hydro on CT
Bladder wall thickening noted on CT. UA not c/w infection  -No hydro on CT  -If CINDY does not improve or is continually retaining clinically, then urology consult

## 2021-05-05 NOTE — DISCHARGE NOTE PROVIDER - NSDCMRMEDTOKEN_GEN_ALL_CORE_FT
amLODIPine 5 mg oral tablet: 1 tab(s) orally once a day  carvedilol 12.5 mg oral tablet: 1 tab(s) orally 2 times a day  celecoxib 200 mg oral capsule: 1 cap(s) orally 2 times a day, As Needed  donepezil 5 mg oral tablet: 2 tab(s) orally once a day (at bedtime)  famotidine 40 mg oral tablet: 1 tab(s) orally once a day (at bedtime)  levothyroxine 100 mcg (0.1 mg) oral tablet: 1 tab(s) orally once a day  oxyCODONE 5 mg oral tablet: 1 tab(s) orally every 8 hours, As Needed -for severe pain MDD:20mg  pravastatin 80 mg oral tablet: 1 tab(s) orally once a day  tamsulosin 0.4 mg oral capsule: 1 cap(s) orally once a day   acetaminophen 325 mg oral tablet: 2 tab(s) orally every 6 hours, As Needed  amLODIPine 5 mg oral tablet: 1 tab(s) orally once a day  carvedilol 12.5 mg oral tablet: 1 tab(s) orally 2 times a day  donepezil 5 mg oral tablet: 2 tab(s) orally once a day (at bedtime)  famotidine 40 mg oral tablet: 1 tab(s) orally once a day (at bedtime)  levothyroxine 100 mcg (0.1 mg) oral tablet: 1 tab(s) orally once a day  oxyCODONE 5 mg oral tablet: 1 tab(s) orally every 8 hours, As Needed -for severe pain MDD:20mg  pravastatin 80 mg oral tablet: 1 tab(s) orally once a day  tamsulosin 0.4 mg oral capsule: 1 cap(s) orally once a day

## 2021-05-05 NOTE — PROGRESS NOTE ADULT - PROBLEM SELECTOR PLAN 2
Image negative abdominal pain with RUQ tenderness to palpation but also worse with deep breaths. DDX: ulcer or gastritis from celecoxib, unlikely biliary given normal imaging CMP, referred pain from fracture  -Protonix 40 IV daily  - Will trial Carafate 4x daily  -Monitor CBC  -Hold NSAID  -Pain control for rib fx as above  -If does not improve with above, then consider GI c/s.
Image negative abdominal pain with RUQ tenderness to palpation but also worse with deep breaths. DDX: ulcer or gastritis from celecoxib, unlikely biliary given normal imaging CMP, referred pain from fracture. Seen by GI today, suspect referred pain from fractures  -c/w Protonix PO. On discharge will resume home Pepcid  - d/c Carafate  -Monitor CBC  -Hold NSAID  -Pain control for rib fx as above
Image negative abdominal pain with RUQ tenderness to palpation but also worse with deep breaths. DDX: ulcer or gastritis from celecoxib, unlikely biliary given normal imaging CMP, referred pain from fracture  -Protonix 40 IV daily. Transition to PO  - Trialing Carafate 4x daily  -Monitor CBC  -Hold NSAID  -Pain control for rib fx as above  -If does not improve with above, then consider GI c/s.
Image negative abdominal pain with RUQ tenderness to palpation but also worse with deep breaths. DDX: ulcer or gastritis from celecoxib, unlikely biliary given normal imaging CMP, referred pain from fracture  -Protonix 40 IV daily  - Will trial Carafate 4x daily  -Monitor CBC  -Hold NSAID  -Pain control for rib fx as above  -If does not improve with above, then consider GI c/s.

## 2021-05-05 NOTE — DISCHARGE NOTE PROVIDER - NSDCFUADDINST_GEN_ALL_CORE_FT
Patient can follow-up outpatient if abdominal pain persists and for further evaluation of abnormal CT A/P with read of rectal wall thickening (Patient can call [phone] to make an appointment with [facility])    Please follow up with your PCP and have your kidney functions monitored in 1 -2 weeks

## 2021-05-05 NOTE — DISCHARGE NOTE NURSING/CASE MANAGEMENT/SOCIAL WORK - PATIENT PORTAL LINK FT
You can access the FollowMyHealth Patient Portal offered by [facility] by registering at the following website: [email or web address]. By joining [employer]’s FollowMyHealth portal, you will also be able to view your health information using other applications (apps) compatible with our system.

## 2021-05-05 NOTE — DISCHARGE NOTE PROVIDER - CARE PROVIDER_API CALL
Christopher Pang  Internal Medicine  [address] [place] [address], [place] [address]  Phone: [phone]  Fax: ()-  Follow Up Time:

## 2021-05-05 NOTE — DISCHARGE NOTE PROVIDER - NSDCCPCAREPLAN_GEN_ALL_CORE_FT
PRINCIPAL DISCHARGE DIAGNOSIS  Diagnosis: CINYD (acute kidney injury)  Assessment and Plan of Treatment:       SECONDARY DISCHARGE DIAGNOSES  Diagnosis: Rib fractures  Assessment and Plan of Treatment:      PRINCIPAL DISCHARGE DIAGNOSIS  Diagnosis: CINDY (acute kidney injury)  Assessment and Plan of Treatment: Your kidney function has improved after giving you IV fluids  Please follow up with your rafaelary care physican      SECONDARY DISCHARGE DIAGNOSES  Diagnosis: Rib fractures  Assessment and Plan of Treatment: Pain control with tylenol, should you develop any worsening short of breath, fever, uncontrolled pain, please return bacjk to the ED.    Diagnosis: Chronic systolic congestive heart failure  Assessment and Plan of Treatment: Weigh yourself daily.  If you gain 3lbs in 3 days, or 5lbs in a week call your Health Care Provider.  Do not eat or drink foods containing more than 2000mg of salt (sodium) in your diet every day.  Call your Health Care Provider if you have any swelling or increased swelling in your feet, ankles, and/or stomach.  Take all of your medication as directed.  If you become dizzy call your Health Care Provider.      Diagnosis: HTN (hypertension)  Assessment and Plan of Treatment: Low salt diet  Activity as tolerated.  Take all medication as prescribed.  Follow up with your medical doctor for routine blood pressure monitoring at your next visit.  Notify your doctor if you have any of the following symptoms:   Dizziness, Lightheadedness, Blurry vision, Headache, Chest pain, Shortness of breath      Diagnosis: Bladder wall thickening  Assessment and Plan of Treatment: Bladder wall thickening noted on CT.  We have chesk a urinalysis and it is not consutent with any infection.    -No hydronephrosis ( water around kidneys )    Diagnosis: Abdominal pain  Assessment and Plan of Treatment: Patient can follow-up outpatient if abdominal pain persists and for further evaluation of abnormal CT A/P with read of rectal wall thickening (Patient can call [phone] to make an appointment with [facility])

## 2021-05-05 NOTE — PROGRESS NOTE ADULT - PROBLEM SELECTOR PLAN 5
-C/w amlodipine 5 mg daily

## 2021-05-05 NOTE — PROGRESS NOTE ADULT - SUBJECTIVE AND OBJECTIVE BOX
Loi Goodwin MD  Division of Hospital Medicine  Pager [phone]  If no response or off hours page: [phone]  ---------------------------------------------------------    PARDEEP MARAVILLA  81y  Male      Patient is a 81y old  Male who presents with a chief complaint of Abdominal pain (02 May 2021 01:50)      INTERVAL HPI/OVERNIGHT EVENTS:  Seen at bedside. Complaining of epigastric/RUQ discomfort after eating. Rib pain okay. States that he has a h/o GERD and was being worked up outpatient by PCP      REVIEW OF SYSTEMS: 10 point ROS negative unless listed above    T(C): 36.7 (21 @ 05:05), Max: 37 (21 @ 02:50)  HR: 69 (21 @ 05:05) (61 - 78)  BP: 126/74 (21 @ 05:05) (126/74 - 186/79)  RR: 18 (21 @ 05:05) (16 - 20)  SpO2: 100% (21 @ 05:05) (100% - 100%)  Wt(kg): --Vital Signs Last 24 Hrs  T(C): 36.7 (02 May 2021 05:05), Max: 37 (02 May 2021 02:50)  T(F): 98.1 (02 May 2021 05:05), Max: 98.6 (02 May 2021 02:50)  HR: 69 (02 May 2021 05:05) (61 - 78)  BP: 126/74 (02 May 2021 05:05) (126/74 - 186/79)  BP(mean): --  RR: 18 (02 May 2021 05:05) (16 - 20)  SpO2: 100% (02 May 2021 05:05) (100% - 100%)    PHYSICAL EXAM:  GENERAL: NAD, well-groomed, well-developed  HEAD:  Atraumatic, Normocephalic  NECK: Supple, No JVD  CHEST/LUNG: Clear to auscultation bilaterally; No rales, rhonchi, wheezing, or rubs  HEART: Regular rate and rhythm; No murmurs, rubs, or gallops  ABDOMEN: Soft, TTP in epigastrium, Nondistended; Bowel sounds present.    EXTREMITIES:  2+ Peripheral Pulses, No clubbing, cyanosis, or edema  PSYCH: Alert & Oriented x3  NERVOUS SYSTEM: Motor Strength 5/5 B/L upper and lower extremities.  Sensory intact    Consultant(s) Notes Reviewed:  [x ] YES  [ ] NO  Care Discussed with Consultants/Other Providers [ x] YES  [ ] NO    LABS:                        9.2    4.62  )-----------( 155      ( 02 May 2021 06:47 )             27.9     05-02    140  |  107  |  19  ----------------------------<  83  3.7   |  21<L>  |  1.28    Ca    8.5      02 May 2021 06:47  Phos  2.1     05-02  Mg     1.5     05-02    TPro  6.0  /  Alb  3.4  /  TBili  0.2  /  DBili  x   /  AST  16  /  ALT  10  /  AlkPhos  57  05-02    PT/INR - ( 01 May 2021 13:11 )   PT: 11.9 sec;   INR: 1.05 ratio         PTT - ( 01 May 2021 13:11 )  PTT:31.2 sec  Urinalysis Basic - ( 02 May 2021 02:10 )    Color: Colorless / Appearance: Clear / S.024 / pH: x  Gluc: x / Ketone: Negative  / Bili: Negative / Urobili: Negative   Blood: x / Protein: Negative / Nitrite: Negative   Leuk Esterase: Negative / RBC: 3 /hpf / WBC 1 /HPF   Sq Epi: x / Non Sq Epi: 0 /hpf / Bacteria: Negative      CAPILLARY BLOOD GLUCOSE            Urinalysis Basic - ( 02 May 2021 02:10 )    Color: Colorless / Appearance: Clear / S.024 / pH: x  Gluc: x / Ketone: Negative  / Bili: Negative / Urobili: Negative   Blood: x / Protein: Negative / Nitrite: Negative   Leuk Esterase: Negative / RBC: 3 /hpf / WBC 1 /HPF   Sq Epi: x / Non Sq Epi: 0 /hpf / Bacteria: Negative        RADIOLOGY & ADDITIONAL TESTS:    Imaging Personally Reviewed:  [x ] YES  [ ] NO
Loi Goodwin MD  Division of Hospital Medicine  Pager [phone]  If no response or off hours page: [phone]  ---------------------------------------------------------    PARDEEP MARAVILLA  81y  Male      Patient is a 81y old  Male who presents with a chief complaint of Abdominal pain (03 May 2021 12:55)      INTERVAL HPI/OVERNIGHT EVENTS:  Seen at bedside. Still with postprandial abdominal pain although improving.       REVIEW OF SYSTEMS: 10 point ROS negative unless listed above    T(C): 36.4 (05-04-21 @ 05:35), Max: 36.4 (05-03-21 @ 13:31)  HR: 70 (05-04-21 @ 05:35) (64 - 71)  BP: 163/82 (05-04-21 @ 05:35) (114/73 - 163/82)  RR: 17 (05-04-21 @ 05:35) (17 - 18)  SpO2: 99% (05-04-21 @ 05:35) (97% - 100%)  Wt(kg): --Vital Signs Last 24 Hrs  T(C): 36.4 (04 May 2021 05:35), Max: 36.4 (03 May 2021 13:31)  T(F): 97.5 (04 May 2021 05:35), Max: 97.6 (03 May 2021 13:31)  HR: 70 (04 May 2021 05:35) (64 - 71)  BP: 163/82 (04 May 2021 05:35) (114/73 - 163/82)  BP(mean): --  RR: 17 (04 May 2021 05:35) (17 - 18)  SpO2: 99% (04 May 2021 05:35) (97% - 100%)    PHYSICAL EXAM:  GENERAL: NAD, well-groomed, well-developed  HEAD:  Atraumatic, Normocephalic  NECK: Supple, No JVD  CHEST/LUNG: Clear to auscultation bilaterally; No rales, rhonchi, wheezing, or rubs  HEART: Regular rate and rhythm; No murmurs, rubs, or gallops  ABDOMEN: Soft, TTP in epigastrium, Nondistended; Bowel sounds present.    EXTREMITIES:  2+ Peripheral Pulses, No clubbing, cyanosis, or edema  PSYCH: Alert & Oriented x3  NERVOUS SYSTEM: Motor Strength 5/5 B/L upper and lower extremities.  Sensory intact    Care Discussed with Consultants/Other Providers [ x] YES  [ ] NO    LABS:                        10.0   4.40  )-----------( 180      ( 04 May 2021 06:50 )             31.4     05-04    138  |  101  |  17  ----------------------------<  94  4.2   |  25  |  1.33<H>    Ca    9.1      04 May 2021 06:51  Mg     1.5     05-03          CAPILLARY BLOOD GLUCOSE                RADIOLOGY & ADDITIONAL TESTS:    Imaging Personally Reviewed:  [x ] YES  [ ] NO
Loi Goodwin MD  Division of Hospital Medicine  Pager [phone]  If no response or off hours page: [phone]  ---------------------------------------------------------    PARDEEP MRAAVILLA  81y  Male      Patient is a 81y old  Male who presents with a chief complaint of Abdominal pain (05 May 2021 11:06)      INTERVAL HPI/OVERNIGHT EVENTS:  Seen at bedside. Continuing to have abdominal pain      REVIEW OF SYSTEMS: 10 point ROS negative unless listed above    T(C): 36.7 (05-05-21 @ 09:33), Max: 36.7 (05-05-21 @ 09:33)  HR: 70 (05-05-21 @ 09:33) (70 - 74)  BP: 131/66 (05-05-21 @ 09:33) (121/69 - 132/70)  RR: 18 (05-05-21 @ 09:33) (18 - 18)  SpO2: 100% (05-05-21 @ 09:33) (99% - 100%)  Wt(kg): --Vital Signs Last 24 Hrs  T(C): 36.7 (05 May 2021 09:33), Max: 36.7 (05 May 2021 09:33)  T(F): 98 (05 May 2021 09:33), Max: 98 (05 May 2021 09:33)  HR: 70 (05 May 2021 09:33) (70 - 74)  BP: 131/66 (05 May 2021 09:33) (121/69 - 132/70)  BP(mean): --  RR: 18 (05 May 2021 09:33) (18 - 18)  SpO2: 100% (05 May 2021 09:33) (99% - 100%)    PHYSICAL EXAM:  GENERAL: NAD, well-groomed, well-developed  HEAD:  Atraumatic, Normocephalic  NECK: Supple, No JVD  CHEST/LUNG: Clear to auscultation bilaterally; No rales, rhonchi, wheezing, or rubs  HEART: Regular rate and rhythm; No murmurs, rubs, or gallops  ABDOMEN: Soft, TTP in epigastrium, Nondistended; Bowel sounds present.    EXTREMITIES:  2+ Peripheral Pulses, No clubbing, cyanosis, or edema  PSYCH: Alert & Oriented x3  NERVOUS SYSTEM: Motor Strength 5/5 B/L upper and lower extremities.  Sensory intact    Consultant(s) Notes Reviewed:  [x ] YES  [ ] NO  Care Discussed with Consultants/Other Providers [ x] YES  [ ] NO    LABS:                        9.3    5.15  )-----------( 159      ( 05 May 2021 07:13 )             28.3     05-05    135  |  102  |  18  ----------------------------<  92  4.3   |  23  |  1.25    Ca    8.8      05 May 2021 07:08          CAPILLARY BLOOD GLUCOSE                RADIOLOGY & ADDITIONAL TESTS:    Imaging Personally Reviewed:  [x ] YES  [ ] NO
Loi Goodwin MD  Division of Hospital Medicine  Pager [phone]  If no response or off hours page: [phone]  ---------------------------------------------------------    PARDEEP MARAVILLA  81y  Male      Patient is a 81y old  Male who presents with a chief complaint of Abdominal pain (02 May 2021 12:53)      INTERVAL HPI/OVERNIGHT EVENTS:  Seen at bedside. Still with some abdominal discomfort after eating      REVIEW OF SYSTEMS: 10 point ROS negative unless listed above    T(C): 36.3 (21 @ 05:33), Max: 36.8 (21 @ 14:01)  HR: 63 (21 @ 05:33) (63 - 70)  BP: 134/70 (21 @ 05:33) (124/74 - 149/71)  RR: 18 (21 @ 05:33) (18 - 18)  SpO2: 98% (21 @ 05:33) (97% - 100%)  Wt(kg): --Vital Signs Last 24 Hrs  T(C): 36.3 (03 May 2021 05:33), Max: 36.8 (02 May 2021 14:01)  T(F): 97.4 (03 May 2021 05:33), Max: 98.2 (02 May 2021 14:01)  HR: 63 (03 May 2021 05:33) (63 - 70)  BP: 134/70 (03 May 2021 05:33) (124/74 - 149/71)  BP(mean): --  RR: 18 (03 May 2021 05:33) (18 - 18)  SpO2: 98% (03 May 2021 05:33) (97% - 100%)    PHYSICAL EXAM:  GENERAL: NAD, well-groomed, well-developed  HEAD:  Atraumatic, Normocephalic  NECK: Supple, No JVD  CHEST/LUNG: Clear to auscultation bilaterally; No rales, rhonchi, wheezing, or rubs  HEART: Regular rate and rhythm; No murmurs, rubs, or gallops  ABDOMEN: Soft, TTP in epigastrium, Nondistended; Bowel sounds present.    EXTREMITIES:  2+ Peripheral Pulses, No clubbing, cyanosis, or edema  PSYCH: Alert & Oriented x3  NERVOUS SYSTEM: Motor Strength 5/5 B/L upper and lower extremities.  Sensory intact    Consultant(s) Notes Reviewed:  [x ] YES  [ ] NO  Care Discussed with Consultants/Other Providers [ x] YES  [ ] NO    LABS:                        9.2    4.62  )-----------( 155      ( 02 May 2021 06:47 )             27.9     05-03    138  |  101  |  19  ----------------------------<  103<H>  4.4   |  25  |  1.42<H>    Ca    9.2      03 May 2021 08:34  Phos  2.1     05-02  Mg     1.5     05-03    TPro  6.0  /  Alb  3.4  /  TBili  0.2  /  DBili  x   /  AST  16  /  ALT  10  /  AlkPhos  57  05-02    PT/INR - ( 01 May 2021 13:11 )   PT: 11.9 sec;   INR: 1.05 ratio         PTT - ( 01 May 2021 13:11 )  PTT:31.2 sec  Urinalysis Basic - ( 02 May 2021 02:10 )    Color: Colorless / Appearance: Clear / S.024 / pH: x  Gluc: x / Ketone: Negative  / Bili: Negative / Urobili: Negative   Blood: x / Protein: Negative / Nitrite: Negative   Leuk Esterase: Negative / RBC: 3 /hpf / WBC 1 /HPF   Sq Epi: x / Non Sq Epi: 0 /hpf / Bacteria: Negative      CAPILLARY BLOOD GLUCOSE            Urinalysis Basic - ( 02 May 2021 02:10 )    Color: Colorless / Appearance: Clear / S.024 / pH: x  Gluc: x / Ketone: Negative  / Bili: Negative / Urobili: Negative   Blood: x / Protein: Negative / Nitrite: Negative   Leuk Esterase: Negative / RBC: 3 /hpf / WBC 1 /HPF   Sq Epi: x / Non Sq Epi: 0 /hpf / Bacteria: Negative        RADIOLOGY & ADDITIONAL TESTS:    Imaging Personally Reviewed:  [x ] YES  [ ] NO

## 2021-05-05 NOTE — CONSULT NOTE ADULT - ASSESSMENT
Impression:    80 y/o M w/ hx of dilated cardiomyopathy, AV biventricular pacemaker, and hypothyroidism, with mechanical fall c/b by rib fractures approximately 2 weeks ago, with complaint of right sided abdominal pain    # Abdominal pain: Likely represents referred pain in setting of recent rib fractures. Less likely to represent PUD given pain is not associated with eating and patient is tolerating regular diet without complaints.   # Rectal wall thickening: Likely represents underdistention of the colon, however, given thin caliber stools may represent from flex sig or colonoscopy, however, would only consider after healing of rib fractures.    Recommendations:  - No plan for endoscopic evaluation at this time  - No GI objection to discharge  - Patient can follow-up outpatient if abdominal pain persists and for further evaluation of abnormal CT A/P with read of rectal wall thickening (Patient can call [phone] to make an appointment with [facility])  - Continue PPI PO daily  - Rest of care per primary team    Galindo Burgos MD  Gastroenterology Fellow  Please contact via [employer] Teams  Pager number:   [phone] (Long range pager)  [phone] ([facility] ????J pager)  Pageable via "[employer]" at [facility] and [facility]. Select Tools --> On Call [facility]-ED --> Search for name    Please call GI ([phone]) or e-mail [email or web address] if there are any additional questions or concerns, during weekdays from 8 am to 5 pm.     Please call answering service for on-call GI fellow ([phone]) after 5pm and before 8am, and on weekends.

## 2021-05-05 NOTE — DISCHARGE NOTE PROVIDER - HOSPITAL COURSE
81 M dilated cardiomyopathy, AV biventricular pacemaker, and hypothyroidism presenting with 3 right rib fractures and acute kidney injury.    ·  Problem: CINDY (acute kidney injury).  Plan: Acute kidney injury: Likely ATN (bilateral striated nephrogram on CT and NSAID usage). Cr continuing to improve  -FeNA is 2.7 c/w intrinsic etiology  - s/p gentle IVF hydration on 5/3.   -Trend BMP.  - Monitor UOP, if decreased would check PVR  -c/w Tamsulosin 0.4.     ·   Abdominal pain.  Plan: Image negative abdominal pain with RUQ tenderness to palpation but also worse with deep breaths. DDX: ulcer or gastritis from celecoxib, unlikely biliary given normal imaging CMP, referred pain from fracture. Seen by GI today, suspect referred pain from fractures  -c/w Protonix PO. On discharge will resume home Pepcid  - d/c Carafate  -Monitor CBC  -Hold NSAID  -Pain control for rib fx as above.       ·   Rib fractures.  Plan: Traumatic rib fractures in ribs 9 through 11 following mechanical fall  - Pain control with standing tylenol Q6, lidocaine patch,   -Incentive spirometry  - OOB.        ·  Chronic systolic congestive heart failure.  Plan: Patient reports ef 36%, no echo on record  -C/w home meds: coreg 12.5 BID  -C/w statin  -Judicious IVF iso CINDY.       ·  HTN (hypertension).  Plan: -C/w amlodipine 5 mg daily.        Bladder wall thickening. Plan: Bladder wall thickening noted on CT. UA not c/w infection  -No hydro on CT.     81 M dilated cardiomyopathy, AV biventricular pacemaker, and hypothyroidism presenting with 3 right rib fractures and acute kidney injury following a mechanical fall .    ·   CINDY (acute kidney injury).  Plan: Acute kidney injury: Likely ATN (bilateral striated nephrogram on CT and NSAID usage). Cr continuing to improve  -FeNA is 2.7 c/w intrinsic etiology  - s/p gentle IVF hydration on 5/3.   - Monitor UOP, if decreased would check PVR,  Cr improved to 1.25   -c/w Tamsulosin 0.4.     ·   Abdominal pain.  Plan: Image negative -abdominal pain with RUQ tenderness to palpation but also worse with deep breaths. DDX: ulcer or gastritis from celecoxib, unlikely biliary given normal imaging CMP, referred pain from fracture. Seen by GI today, suspect referred pain from fractures  -c/w Protonix PO. On discharge will resume home Pepcid  - d/c Carafate  -Monitor CBC  -Hold NSAID  -Pain control for rib fx as above.   GI follow up - Patient can follow-up outpatient if abdominal pain persists and for further evaluation of abnormal CT A/P with read of rectal wall thickening (Patient can call [phone] to make an appointment with [facility])      ·   Rib fractures.  Plan: Traumatic rib fractures in ribs 9 through 11 following mechanical fall  - Pain control with standing tylenol Q6, lidocaine patch,   -Incentive spirometry  - OOB.        ·  Chronic systolic congestive heart failure.  Plan: Patient reports ef 36%, no echo on record  -C/w home meds: coreg 12.5 BID  -C/w statin  -Judicious IVF iso CINDY.     ·  HTN (hypertension).  Plan: -C/w amlodipine 5 mg daily.        Bladder wall thickening. Plan: Bladder wall thickening noted on CT. UA not c/w infection  -No hydro on CT.  Pt medically stable for discharge and to follow up with his   PCP and GI if necessary

## 2021-05-05 NOTE — PROGRESS NOTE ADULT - PROBLEM SELECTOR PLAN 7
Heparin 5000 sq TID  Protonix 40    Medically stable for discharge home today with outpatient GI follow up.  d/c time spent 37 minutes
Heparin 5000 sq TID  Protonix 40 IV daily
Heparin 5000 sq TID  Protonix 40 IV daily
Heparin 5000 sq TID  Protonix 40

## 2021-05-05 NOTE — PROGRESS NOTE ADULT - PROBLEM SELECTOR PLAN 1
Acute kidney injury: Likely ATN (bilateral striated nephrogram on CT and NSAID usage). Cr continuing to improve  -FeNA is 2.7 c/w intrinsic etiology  - s/p gentle IVF hydration on 5/3.   -Trend BMP.  - Monitor UOP, if decreased would check PVR  -c/w Tamsulosin 0.4
Acute kidney injury: Likely ATN (bilateral striated nephrogram on CT and NSAID usage). Cr decreased slightly today  -FeNA is 2.7 c/w intrinsic etiology  - s/p gentle IVF hydration on 5/3.   -Trend BMP. If Cr continues to improve tomorrow likely d/c home  - Monitor UOP, if decreased would check PVR  -c/w Tamsulosin 0.4
Traumatic rib fractures in ribs 9 through 11 following mechanical fall  - Pain control with standing tylenol Q6, lidocaine patch, Tramadol for moderate pain and Oxycodone for severe pain  -Incentive spirometry  - OOB
Traumatic rib fractures in ribs 9 through 11 following mechanical fall  - Pain control with standing tylenol Q6, lidocaine patch, Tramadol for moderate pain and Oxycodone for severe pain  -Incentive spirometry  - OOB

## 2021-05-05 NOTE — CONSULT NOTE ADULT - ATTENDING COMMENTS
Pt seen and examined.   Chart reviewed.  Resident note confirmed.  The patient is a 81 year old male with a medical history significant for CAD and recent right 9th rib fracture from a fall who presented to [facility] with abdominal pain.  His pain is located in the right side of his abdomen for the past 3 days. It is sharp in nature and extends the length of the right rectus muscle. It is not peritoneal in nature. It is associated with vomiting. He is having normal bowel function.   At [facility] and CT A/P was done due to his abdominal pain and he was noticed to have right sided rib fractures 9-11. Pt reports has not fallen since the fall he had two weeks ago. He denies significant coughing events. Pt was transferred to [facility] for trauma evaluation. Pt is a very small man and is pulling 750ml on ISP.    PMH/PSH/MEDS/ALL/SH/FH/ROS: Unchanged from H&P  Vitals/PE/Labs/Radiographs: Reviewed    Labs are significant for an CINDY    CT of the abdomen and pelvis was significant for:  Acute minimally displaced fracture of the right posterolateral ninth rib and nondisplaced fractures of the posterior 11th and 10th ribs at the costovertebral junctions. Re-demonstrated minimally displaced fracture of the right 10th rib.  Bilateral striated nephrogram with question urinary bladder wall thickening. Striated nephrogram may be seen in the setting of infection or infarct. Correlate with urinalysis.  Question rectal wall thickening versus underdistention. Correlate clinically for proctitis.    A/p  Abdominal pain  CINDY  Three closed rib fx  I suspect that the two non-displaced rib fractures were present since his fall two weeks ago  Continue ISP  Medical consultation for management of ATN  Suggest checking urine lytes  Pt has known thickening of the bladder from his prior CT's and has not completed follow up  Suggest urology consult during this admission  Will follow with you.
Patient seen and examined, agree with above. He reports right sided flank pain/abdominal pain that only started after diagnosis of his rib fractures, is unrelated to eating/BMs. He has no changes in bowel habits and has no trouble eating. Suspect pain more likely due to rib fractures. Would continue supportive care and pain control. If pain does not resolve over time, then can consider EGD which patient would consider as outpatient.

## 2021-05-05 NOTE — PROGRESS NOTE ADULT - PROBLEM SELECTOR PLAN 3
Acute kidney injury: Likely ATN (bilateral striated nephrogram on CT and NSAID usage)  -FeNA is 2.7 c/w intrinsic etiology  -Trend BMP  -POV 56  -c/w Tamsulosin 0.4
Traumatic rib fractures in ribs 9 through 11 following mechanical fall  - Pain control with standing tylenol Q6, lidocaine patch,   - Tramadol for moderate pain and Oxycodone for severe pain. Not requiring so will discontinue  -Incentive spirometry  - OOB
Traumatic rib fractures in ribs 9 through 11 following mechanical fall  - Pain control with standing tylenol Q6, lidocaine patch,   -Incentive spirometry  - OOB
Acute kidney injury: Likely ATN (bilateral striated nephrogram on CT and NSAID usage). Cr increased slightly today  -FeNA is 2.7 c/w intrinsic etiology  - Will trial gentle IVF hydration as pt states he has not really been drinking.   -Trend BMP  - Monitor UOP, if decreased would check PVR  -c/w Tamsulosin 0.4

## 2021-05-05 NOTE — CONSULT NOTE ADULT - SUBJECTIVE AND OBJECTIVE BOX
GENERAL SURGERY CONSULT NOTE  --------------------------------------------------------------------------------------------    Patient is a 81y old male with a PMH of a pacemaker, CAD, and recent right 9th rib fracture two weeks ago from a fall who presented to [facility] with abdominal pain.  His pain is located in the right side of his abdomen for the past 3 days and he vomited one time this morning.  He is having normal bowel function.  His last colonoscopy was 15 years ago where they removed a polyp per the patient.  He states he had an EGD over 20 years ago that showed some inflammation.  He reports having some episodes of GERD that he takes a PPI for.  At [facility] and CT A/P was done due to his abdominal pain and he was noticed to have right sided rib fractures 9-.  He was then sent to [facility] for further management.  He has not fallen since the fall he had two weeks ago.      ROS: 10-system review is otherwise negative except HPI above.      PAST MEDICAL & SURGICAL HISTORY:  Hypothyroidism    Bradycardia    No significant past surgical history      FAMILY HISTORY:      ALLERGIES: No Known Allergies    CURRENT MEDICATIONS  MEDICATIONS (STANDING):   MEDICATIONS (PRN):  --------------------------------------------------------------------------------------------    Vitals:   T(C): 36.6 (21 @ 22:43), Max: 36.8 (21 @ 22:21)  HR: 71 (21 22:43) (62 - 78)  BP: 186/79 (21 22:43) (135/66 - 186/79)  RR: 20 (21 22:43) (16 - 20)  SpO2: 100% (21 @ 22:43) (100% - 100%)  CAPILLARY BLOOD GLUCOSE        CAPILLARY BLOOD GLUCOSE          Height (cm): 152.4 ( @ :21)  Weight (kg): 37.6 ( @ 22:21)  BMI (kg/m2): 16.2 ( @ 22:21)  BSA (m2): 1.28 (:21)    PHYSICAL EXAM:  General: NAD, Lying in bed comfortably  Neuro: A+Ox3  HEENT: NC/AT   Cardio: Regular rate  Resp: Good effort, no accessory muscle use.    Thorax: No tenderness over the right chest wall.  Breast: No lesions/masses, no drainage  GI/Abd: Soft, ND, mild tenderness on the right side of the abdomen.  There are b/l reducible inguinal hernias that are not tender to palpation.  Musculoskeletal: All 4 extremities moving spontaneously  --------------------------------------------------------------------------------------------    LABS  CBC ( 13:11)                              10.5<L>                         7.41    )----------------(  187        81.8<H>% Neutrophils, 11.5<L>% Lymphocytes, ANC: 6.06                                32.5<L>    BMP ( 13:11)             136     |  101     |  33<H> 		Ca++ --      Ca 9.1                ---------------------------------( 105<H>		Mg --                 4.0     |  24      |  1.54<H>			Ph --        LFTs ( 13:11)      TPro 7.5 / Alb 4.5 / TBili 0.2 / DBili -- / AST 18 / ALT 10 / AlkPhos 78    Coags ( 13:11)  aPTT 31.2 / INR 1.05 / PT 11.9        VBG ( @ 13:11)     7.31<L> / 52<H> / <24<L> / 22 / -0.2 / 27.1<L>%     Lactate: 0.8    --------------------------------------------------------------------------------------------    MICROBIOLOGY  Urinalysis ( @ 13:11):     Color: Brown<!> / Appearance: Turbid<!> / S.019 / pH: 6.0 / Gluc: Negative / Ketones: Negative / Bili: Negative / Urobili: <2 mg/dL / Protein :30 mg/dL<!> / Nitrites: Negative / Leuk.Est: Negative / RBC: 5<H> / WBC: 3 / Sq Epi:  / Non Sq Epi: 2 / Bacteria Negative         --------------------------------------------------------------------------------------------    IMAGING  CT A/P:  PROCEDURE:  CT of the Abdomen and Pelvis was performed.  Sagittal and coronal reformats were performed.    FINDINGS:  LOWER CHEST: Mild bibasilar linear atelectasis. Partially imaged cardiac device leads. Coronary artery calcifications.    LIVER: A left lobe cyst and additional scattered subcentimeter hypodensities that are too small to characterize. A calcified granuloma.  BILE DUCTS: Normal caliber.  GALLBLADDER: Within normal limits.  SPLEEN: Within normal limits.  PANCREAS: Within normal limits.  ADRENALS: Within normal limits.  KIDNEYS/URETERS: Bilateral striated nephrogram. No hydronephrosis. Bilateral subcentimeter hypodense renal foci that are too small to characterize.    BLADDER: Mild circumferential bladder wall thickening versus underdistention.  REPRODUCTIVE ORGANS: Normal sized prostate with coarse intraprostatic calcifications.    BOWEL: Question rectal wall thickening versus underdistention. No bowel obstruction.  PERITONEUM: Trace pelvic ascites, new.  VESSELS: Atherosclerotic changes.  RETROPERITONEUM/LYMPH NODES: No lymphadenopathy.  ABDOMINAL WALL: Moderate right and small left fat-containing inguinal hernias.  BONES: Acute minimally displaced fracture of the right posterolateral ninth rib and nondisplaced fractures of the posterior 11th and 10th ribs at the costovertebral junctions. Redemonstrated minimally displaced fracture of the right 10th rib. Degenerative changes.    IMPRESSION:  Acute minimally displaced fracture of the right posterolateral ninth rib and nondisplaced fractures of the posterior 11th and 10th ribs at the costovertebral junctions. Redemonstrated minimally displaced fracture of the right 10th rib.    Bilateral striated nephrogram with question urinary bladder wall thickening. Striated nephrogram may be seen in the setting of infection or infarct. Correlate with urinalysis.    Question rectal wall thickening versus underdistention. Correlate clinically for proctitis.      ASSESSMENT:  Patient is an 81y old male with a PMH of a pacemaker and CAD with 9-11 right sided rib fractures as well as abdominal pain.    PLAN:     - Would obtain Chest CT to look for other rib fractures and to compare to CT chest from two weeks ago as patient has two new right sided rib fractures on most recent CT abd/pel.  - Please obtain urology consult as patient has possible bladder thickening and bilateral striated nephrogram on CT and was to follow up with urology after last ED visit.  - Would send urine lytes and medical evaluation for CINDY with his elevated creatinine of 1.54 (previously 1.0 two weeks ago)  - Plan discussed with Attending     Josemanuel Cameron PGY4  ACS #[id number]
Chief Complaint: Right sided abdominal pain    HPI:    82 y/o M w/ hx of dilated cardiomyopathy, AV biventricular pacemaker, and hypothyroidism, with mechanical fall c/b by rib fractures approximately 2 weeks ago, with complaint of right sided abdominal pain; GI consulted for evaluation.    Patient reports developing right sided back pain and right sided chest pain which radiates to her RUQ and RLQ in his abdomen after his mechanical fall. He describes the pain as a sharp, cutting sensation which is worsened with movement and deep breathing. The pain is not worse after meals. He otherwise denies nausea/vomiting and early satiety. He denies bloody stools and black tarry stools. He has noted thin caliber stools for the past 5 years, however, otherwise has no changes in his bowel habits. He takes celecoxib, but otherwise denies NSAID use for control of his pain.    Patient reports he last underwent EGD/colonoscopy 15 years ago with GI employed at [facility].     Allergies:  No Known Allergies    Home Medications:  amLODIPine 5 mg oral tablet: 1 tab(s) orally once a day  carvedilol 12.5 mg oral tablet: 1 tab(s) orally 2 times a day  celecoxib 200 mg oral capsule: 1 cap(s) orally 2 times a day, As Needed  donepezil 5 mg oral tablet: 2 tab(s) orally once a day (at bedtime)  famotidine 40 mg oral tablet: 1 tab(s) orally once a day (at bedtime)  levothyroxine 100 mcg (0.1 mg) oral tablet: 1 tab(s) orally once a day  oxyCODONE 5 mg oral tablet: 1 tab(s) orally every 8 hours, As Needed -for severe pain MDD:20mg  pravastatin 80 mg oral tablet: 1 tab(s) orally once a day  tamsulosin 0.4 mg oral capsule: 1 cap(s) orally once a day    Hospital Medications:  acetaminophen   Tablet .. 650 milliGRAM(s) Oral every 6 hours  amLODIPine   Tablet 5 milliGRAM(s) Oral daily  atorvastatin 20 milliGRAM(s) Oral at bedtime  carvedilol 12.5 milliGRAM(s) Oral every 12 hours  heparin   Injectable 5000 Unit(s) SubCutaneous every 8 hours  levothyroxine 100 MICROGram(s) Oral daily  lidocaine   Patch 1 Patch Transdermal every 24 hours  pantoprazole    Tablet 40 milliGRAM(s) Oral before breakfast  sucralfate 1 Gram(s) Oral four times a day  tamsulosin 0.4 milliGRAM(s) Oral at bedtime    Allergies:   No Known Allergies    PMHX/PSHX:  Hypothyroidism    Bradycardia    Chronic systolic congestive heart failure    HTN (hypertension)    Family history:  FH: heart disease (Sibling)    Denies family history of colon cancer/polyps, stomach cancer/polyps, pancreatic cancer/masses, liver cancer/disease, ovarian cancer and endometrial cancer.    Social History:     Tob: Denies  EtOH: Denies  Illicit Drugs: Denies    ROS:     General:  No wt loss, fevers, chills, night sweats, fatigue  Eyes:  Good vision, no reported pain  ENT:  No sore throat, pain, runny nose, dysphagia  CV:  + pain, palpitations, hypo/hypertension  Pulm:  No dyspnea, cough, tachypnea, wheezing  GI:  + pain, No nausea, No vomiting, No diarrhea, No constipation, No weight loss, No fever, No pruritis, No bright red blood per rectum, No tarry stools, No dysphagia,  :  No pain, bleeding, incontinence, nocturia  Muscle:  No pain, weakness  Neuro:  No weakness, tingling, memory problems  Psych:  No fatigue, insomnia, mood problems, depression  Endocrine:  No polyuria, polydipsia, cold/heat intolerance  Heme:  No petechiae, ecchymosis, easy bruisability  Skin:  No rash, tattoos, scars, edema    PHYSICAL EXAM:     Vital Signs:  Vital Signs Last 24 Hrs  T(C): 36.7 (05 May 2021 09:33), Max: 36.7 (05 May 2021 09:33)  T(F): 98 (05 May 2021 09:33), Max: 98 (05 May 2021 09:33)  HR: 70 (05 May 2021 09:33) (70 - 74)  BP: 131/66 (05 May 2021 09:33) (121/69 - 132/70)  RR: 18 (05 May 2021 09:33) (18 - 18)  SpO2: 100% (05 May 2021 09:33) (99% - 100%)    GENERAL:  No acute distress  HEENT:  Normocephalic/atraumatic, no scleral icterus  CHEST:  Normal effort, no accessory muscle use, + tenderness overlying ribs  ABDOMEN:  Soft, minimally tender in RUQ, non-distended  BACK: Point tenderness in right lower back  EXTREMITIES: No cyanosis, clubbing, or edema  SKIN:  No rash/erythema  NEURO:  Alert and oriented x 3    LABS:                        9.3    5.15  )-----------( 159      ( 05 May 2021 07:13 )             28.3     Mean Cell Volume: 95.9 fl (05-05-21 @ 07:13)    05-05    135  |  102  |  18  ----------------------------<  92  4.3   |  23  |  1.25    Ca    8.8      05 May 2021 07:08               9.3    5.15  )-----------( 159      ( 05 May 2021 07:13 )             28.3                         10.0   4.40  )-----------( 180      ( 04 May 2021 06:50 )             31.4     Imaging:    < from: CT Abdomen and Pelvis w/ IV Cont (05.01.21 @ 15:36) >    EXAM:  CT ABDOMEN AND PELVIS IC        PROCEDURE DATE:  May  1 2021         INTERPRETATION:  CLINICAL INFORMATION: Right-sided abdominal pain and tenderness for 2 days. Associated nausea with references nonbloody nonbilious vomiting. Status post fall a few weeks ago with a right 10th rib fracture.    COMPARISON: CT chest/abdomen/pelvis 4/18/2021.    CONTRAST/COMPLICATIONS:  IV Contrast: Omnipaque 350  80 cc administered   20 cc discarded  Oral Contrast: NONE  Complications: None reported at time of study completion    PROCEDURE:  CT of the Abdomen and Pelvis was performed.  Sagittal and coronal reformats were performed.    FINDINGS:  LOWER CHEST: Mild bibasilar linear atelectasis. Partially imaged cardiac device leads. Coronary artery calcifications.    LIVER: A left lobe cyst and additional scattered subcentimeter hypodensities that are too small to characterize. A calcified granuloma.  BILE DUCTS: Normal caliber.  GALLBLADDER: Within normal limits.  SPLEEN: Within normal limits.  PANCREAS: Within normal limits.  ADRENALS: Within normal limits.  KIDNEYS/URETERS: Bilateral striated nephrogram. No hydronephrosis. Bilateral subcentimeter hypodense renal foci that are too small to characterize.    BLADDER: Mild circumferential bladder wall thickening versus underdistention.  REPRODUCTIVE ORGANS: Normal sized prostate with coarse intraprostatic calcifications.    BOWEL: Question rectal wall thickening versus underdistention. No bowel obstruction.  PERITONEUM: Trace pelvic ascites, new.  VESSELS: Atherosclerotic changes.  RETROPERITONEUM/LYMPH NODES: No lymphadenopathy.  ABDOMINAL WALL: Moderate right and small left fat-containing inguinal hernias.  BONES: Acute minimally displaced fracture of the right posterolateral ninth rib and nondisplaced fractures of the posterior 11th and 10th ribs at the costovertebral junctions. Redemonstrated minimally displaced fracture of the right 10th rib. Degenerative changes.    IMPRESSION:  Acute minimally displaced fracture of the right posterolateral ninth rib and nondisplaced fractures of the posterior 11th and 10th ribs at the costovertebral junctions. Redemonstrated minimally displaced fracture of the right 10th rib.    Bilateral striated nephrogram with question urinary bladder wall thickening. Striated nephrogram may be seen in the setting of infection or infarct. Correlate with urinalysis.    Question rectal wall thickening versus underdistention. Correlate clinically for proctitis.            BRADFORD DELGADO MD; Resident Radiology  This document has been electronically signed.  LAUREL SANDRA M.D., ATTENDING RADIOLOGIST  This document has been electronically signed. May  1 2021  4:19PM    < end of copied text >

## 2021-11-16 PROBLEM — I10 ESSENTIAL (PRIMARY) HYPERTENSION: Chronic | Status: ACTIVE | Noted: 2021-05-02

## 2021-11-16 PROBLEM — I50.22 CHRONIC SYSTOLIC (CONGESTIVE) HEART FAILURE: Chronic | Status: ACTIVE | Noted: 2021-05-02

## 2021-11-26 ENCOUNTER — APPOINTMENT (OUTPATIENT)
Dept: CV DIAGNOSITCS | Facility: HOSPITAL | Age: 81
End: 2021-11-26
Payer: MEDICARE

## 2021-11-26 ENCOUNTER — OUTPATIENT (OUTPATIENT)
Dept: OUTPATIENT SERVICES | Facility: HOSPITAL | Age: 81
LOS: 1 days | End: 2021-11-26

## 2021-11-26 DIAGNOSIS — R07.9 CHEST PAIN, UNSPECIFIED: ICD-10-CM

## 2021-11-26 PROCEDURE — 93306 TTE W/DOPPLER COMPLETE: CPT | Mod: 26

## 2021-12-30 ENCOUNTER — OUTPATIENT (OUTPATIENT)
Dept: OUTPATIENT SERVICES | Facility: HOSPITAL | Age: 81
LOS: 1 days | End: 2021-12-30

## 2021-12-30 ENCOUNTER — APPOINTMENT (OUTPATIENT)
Dept: RADIOLOGY | Facility: HOSPITAL | Age: 81
End: 2021-12-30
Payer: MEDICARE

## 2021-12-30 DIAGNOSIS — R10.9 UNSPECIFIED ABDOMINAL PAIN: ICD-10-CM

## 2021-12-30 PROCEDURE — 74270 X-RAY XM COLON 1CNTRST STD: CPT | Mod: 26

## 2024-05-20 NOTE — ED PROVIDER NOTE - NSFOLLOWUPINSTRUCTIONS_ED_ALL_ED_FT
0 You were seen for rib fractures.     You were transferred to [facility] for a Trauma Consultation.     Broken ribs can result in pneumonia or even a collapsed lung called a pneumothorax which can be life threatening.     Seek immediate medical assistance for any new or worsening symptoms.

## 2024-06-04 NOTE — ED PROVIDER NOTE - PROGRESS NOTE ADDITIONAL2
Septoplasty, [facility] IT BL, endoscopic sinus surgery Septoplasty, [facility] BL, endoscopic sinus surgery Additional Progress Note...
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
## 2024-11-11 NOTE — ED PROVIDER NOTE - MOUTH
Phone Numbers:  [facility]: [phone]  [facility]: [phone]     When to call or come in to the hospital  If you notice a decrease in your baby's movement.   If your begin to experience contractions that are 5 minutes or less apart and lasting for over 45 seconds, or if contractions are becoming more painful.  If you have any bleeding or leakage of fluids.   If you have a headache not resolved with tylenol, right upper abdominal pain, or sudden onset of swelling.  You know your body best. Never hesitate to call or go to the hospital if something doesn't feel right!    Preparing for the hospital  You re likely feeling anxious as your child s birth approaches. This is normal. To give yourself some peace of mind, pack a bag 3-4 weeks before your due date. Here is a list of things to remember:  Personal care items like toothbrush, hair brush, lip balm, lotion, shampoo, glasses, contacts  Nightgown, bathrobe, slippers  Several pairs of underwear  Comfortable clothes for you to wear home  Camera with new batteries  Cell phone and   Insurance information and any other paperwork needed for your hospital stay  Clothes for baby to wear home  An infant, rear-facing car seat for bringing home your baby (this is required by law)   
upper lip with swelling, abrasion with dry blood, upper inner lip with ~1cm superficial laceration, no drainage, no signs of infection

## 2024-12-24 PROBLEM — F10.90 ALCOHOL USE: Status: ACTIVE | Noted: 2018-09-10

## 2025-02-07 ENCOUNTER — EMERGENCY (EMERGENCY)
Facility: HOSPITAL | Age: 85
LOS: 1 days | Discharge: HOME | End: 2025-02-07
Attending: STUDENT IN AN ORGANIZED HEALTH CARE EDUCATION/TRAINING PROGRAM | Admitting: STUDENT IN AN ORGANIZED HEALTH CARE EDUCATION/TRAINING PROGRAM
Payer: MEDICARE

## 2025-02-07 VITALS
WEIGHT: 100.09 LBS | HEIGHT: 60 IN | HEART RATE: 80 BPM | SYSTOLIC BLOOD PRESSURE: 152 MMHG | OXYGEN SATURATION: 99 % | DIASTOLIC BLOOD PRESSURE: 91 MMHG | RESPIRATION RATE: 16 BRPM | TEMPERATURE: 98 F

## 2025-02-07 LAB
ALBUMIN SERPL ELPH-MCNC: 4.1 G/DL — SIGNIFICANT CHANGE UP (ref 3.3–5)
ALP SERPL-CCNC: 66 U/L — SIGNIFICANT CHANGE UP (ref 40–120)
ALT FLD-CCNC: 13 U/L — SIGNIFICANT CHANGE UP (ref 4–41)
ANION GAP SERPL CALC-SCNC: 13 MMOL/L — SIGNIFICANT CHANGE UP (ref 7–14)
AST SERPL-CCNC: 41 U/L — HIGH (ref 4–40)
BASOPHILS # BLD AUTO: 0.07 K/UL — SIGNIFICANT CHANGE UP (ref 0–0.2)
BASOPHILS NFR BLD AUTO: 1.1 % — SIGNIFICANT CHANGE UP (ref 0–2)
BILIRUB SERPL-MCNC: <0.2 MG/DL — SIGNIFICANT CHANGE UP (ref 0.2–1.2)
BUN SERPL-MCNC: 16 MG/DL — SIGNIFICANT CHANGE UP (ref 7–23)
CALCIUM SERPL-MCNC: 9.2 MG/DL — SIGNIFICANT CHANGE UP (ref 8.4–10.5)
CHLORIDE SERPL-SCNC: 99 MMOL/L — SIGNIFICANT CHANGE UP (ref 98–107)
CO2 SERPL-SCNC: 24 MMOL/L — SIGNIFICANT CHANGE UP (ref 22–31)
CREAT SERPL-MCNC: 1.04 MG/DL — SIGNIFICANT CHANGE UP (ref 0.5–1.3)
EGFR: 70 ML/MIN/1.73M2 — SIGNIFICANT CHANGE UP
EOSINOPHIL # BLD AUTO: 0.23 K/UL — SIGNIFICANT CHANGE UP (ref 0–0.5)
EOSINOPHIL NFR BLD AUTO: 3.6 % — SIGNIFICANT CHANGE UP (ref 0–6)
GLUCOSE SERPL-MCNC: 104 MG/DL — HIGH (ref 70–99)
HCT VFR BLD CALC: 29.5 % — LOW (ref 39–50)
HGB BLD-MCNC: 9.6 G/DL — LOW (ref 13–17)
IANC: 4.69 K/UL — SIGNIFICANT CHANGE UP (ref 1.8–7.4)
IMM GRANULOCYTES NFR BLD AUTO: 0.5 % — SIGNIFICANT CHANGE UP (ref 0–0.9)
LYMPHOCYTES # BLD AUTO: 0.85 K/UL — LOW (ref 1–3.3)
LYMPHOCYTES # BLD AUTO: 13.3 % — SIGNIFICANT CHANGE UP (ref 13–44)
MCHC RBC-ENTMCNC: 31.7 PG — SIGNIFICANT CHANGE UP (ref 27–34)
MCHC RBC-ENTMCNC: 32.5 G/DL — SIGNIFICANT CHANGE UP (ref 32–36)
MCV RBC AUTO: 97.4 FL — SIGNIFICANT CHANGE UP (ref 80–100)
MONOCYTES # BLD AUTO: 0.52 K/UL — SIGNIFICANT CHANGE UP (ref 0–0.9)
MONOCYTES NFR BLD AUTO: 8.1 % — SIGNIFICANT CHANGE UP (ref 2–14)
NEUTROPHILS # BLD AUTO: 4.69 K/UL — SIGNIFICANT CHANGE UP (ref 1.8–7.4)
NEUTROPHILS NFR BLD AUTO: 73.4 % — SIGNIFICANT CHANGE UP (ref 43–77)
NRBC # BLD AUTO: 0 K/UL — SIGNIFICANT CHANGE UP (ref 0–0)
NRBC # BLD: 0 /100 WBCS — SIGNIFICANT CHANGE UP (ref 0–0)
NRBC # FLD: 0 K/UL — SIGNIFICANT CHANGE UP (ref 0–0)
NRBC BLD-RTO: 0 /100 WBCS — SIGNIFICANT CHANGE UP (ref 0–0)
PLATELET # BLD AUTO: 213 K/UL — SIGNIFICANT CHANGE UP (ref 150–400)
POTASSIUM SERPL-MCNC: 5.2 MMOL/L — SIGNIFICANT CHANGE UP (ref 3.5–5.3)
POTASSIUM SERPL-SCNC: 5.2 MMOL/L — SIGNIFICANT CHANGE UP (ref 3.5–5.3)
PROT SERPL-MCNC: 7.4 G/DL — SIGNIFICANT CHANGE UP (ref 6–8.3)
RBC # BLD: 3.03 M/UL — LOW (ref 4.2–5.8)
RBC # FLD: 14.6 % — HIGH (ref 10.3–14.5)
SODIUM SERPL-SCNC: 136 MMOL/L — SIGNIFICANT CHANGE UP (ref 135–145)
WBC # BLD: 6.39 K/UL — SIGNIFICANT CHANGE UP (ref 3.8–10.5)
WBC # FLD AUTO: 6.39 K/UL — SIGNIFICANT CHANGE UP (ref 3.8–10.5)

## 2025-02-07 PROCEDURE — 73030 X-RAY EXAM OF SHOULDER: CPT | Mod: 26,RT

## 2025-02-07 PROCEDURE — 93970 EXTREMITY STUDY: CPT | Mod: 26

## 2025-02-07 PROCEDURE — 93010 ELECTROCARDIOGRAM REPORT: CPT

## 2025-02-07 PROCEDURE — 70450 CT HEAD/BRAIN W/O DYE: CPT | Mod: 26

## 2025-02-07 PROCEDURE — 72125 CT NECK SPINE W/O DYE: CPT | Mod: 26

## 2025-02-07 PROCEDURE — 99291 CRITICAL CARE FIRST HOUR: CPT

## 2025-02-07 PROCEDURE — 73060 X-RAY EXAM OF HUMERUS: CPT | Mod: 26,RT

## 2025-02-07 PROCEDURE — 99283 EMERGENCY DEPT VISIT LOW MDM: CPT

## 2025-02-07 PROCEDURE — 71045 X-RAY EXAM CHEST 1 VIEW: CPT | Mod: 26

## 2025-02-07 RX ORDER — OXYCODONE HYDROCHLORIDE 30 MG/1
2.5 TABLET ORAL ONCE
Refills: 0 | Status: DISCONTINUED | OUTPATIENT
Start: 2025-02-07 | End: 2025-02-07

## 2025-02-07 RX ORDER — ACETAMINOPHEN 160 MG/5ML
675 SUSPENSION ORAL ONCE
Refills: 0 | Status: COMPLETED | OUTPATIENT
Start: 2025-02-07 | End: 2025-02-07

## 2025-02-07 RX ADMIN — ACETAMINOPHEN 270 MILLIGRAM(S): 160 SUSPENSION ORAL at 20:44

## 2025-02-07 RX ADMIN — OXYCODONE HYDROCHLORIDE 2.5 MILLIGRAM(S): 30 TABLET ORAL at 22:27

## 2025-02-07 NOTE — CONSULT NOTE ADULT - SUBJECTIVE AND OBJECTIVE BOX
Ortho to see  NWB RUE  Please obtain R axillary shoulder XR (can get Velpeau if unable to tolerate)   Will need sling  HPI  84yMale R-hand dominant w/ R shoulder pain and swelling after a mechanical trip and fall this afternoon.  Has had pain in shoulder and inability to range RUE since fall.  No reported headstrike or LOC but not certain.  Denies numbness/tingling in the RUE. Denies any other trauma/injuries at this time.    ROS  Negative unless otherwise specified in HPI.    PAST MEDICAL & SURGICAL Hx  PAST MEDICAL & SURGICAL HISTORY:  Hypothyroidism      Bradycardia      Chronic systolic congestive heart failure      HTN (hypertension)      No significant past surgical history          MEDICATIONS  Home Medications:  acetaminophen 325 mg oral tablet: 2 tab(s) orally every 6 hours, As Needed (05 May 2021 14:40)  amLODIPine 5 mg oral tablet: 1 tab(s) orally once a day (02 May 2021 03:04)  carvedilol 12.5 mg oral tablet: 1 tab(s) orally 2 times a day (02 May 2021 03:04)  donepezil 5 mg oral tablet: 2 tab(s) orally once a day (at bedtime) (02 May 2021 03:04)  famotidine 40 mg oral tablet: 1 tab(s) orally once a day (at bedtime) (02 May 2021 03:04)  levothyroxine 100 mcg (0.1 mg) oral tablet: 1 tab(s) orally once a day (02 May 2021 03:04)  pravastatin 80 mg oral tablet: 1 tab(s) orally once a day (02 May 2021 03:04)  tamsulosin 0.4 mg oral capsule: 1 cap(s) orally once a day (02 May 2021 03:04)      ALLERGIES  No Known Allergies      FAMILY Hx  FAMILY HISTORY:  FH: heart disease (Sibling)        SOCIAL Hx  Social History:      VITALS  Vital Signs Last 24 Hrs  T(C): 36.5 (07 Feb 2025 23:28), Max: 36.8 (07 Feb 2025 17:57)  T(F): 97.7 (07 Feb 2025 23:28), Max: 98.3 (07 Feb 2025 17:57)  HR: 51 (07 Feb 2025 23:28) (51 - 80)  BP: 119/79 (07 Feb 2025 23:28) (119/79 - 152/91)  BP(mean): --  RR: 17 (07 Feb 2025 23:28) (16 - 17)  SpO2: 98% (07 Feb 2025 23:28) (98% - 99%)    Parameters below as of 07 Feb 2025 23:28  Patient On (Oxygen Delivery Method): room air        PHYSICAL EXAM  Gen: Lying in bed, NAD  Resp: No increased WOB  RUE:  Skin intact  +edema without clear ecchymosis over shoulder  +TTP over shoulder, no TTP along remainder of extremity; compartments soft  Limited ROM at shoulder 2/2 pain  Motor: Ax/Musc/Med/Rad/AIN/PIN/U intact  Sensory: Ax/Musc/Med/Rad/U SILT  +Rad pulse, WWP    Secondary survey:  No TTP along spine or other extremities, pelvis grossly stable, SILT and compartments soft throughout    LABS                        9.6    6.39  )-----------( 213      ( 07 Feb 2025 19:35 )             29.5     02-07    136  |  99  |  16  ----------------------------<  104[H]  5.2   |  24  |  1.04    Ca    9.2      07 Feb 2025 19:35    TPro  7.4  /  Alb  4.1  /  TBili  <0.2  /  DBili  x   /  AST  41[H]  /  ALT  13  /  AlkPhos  66  02-07        IMAGING  XRs: R impacted proximal humerus fx (personal read)

## 2025-02-07 NOTE — ED ADULT NURSE NOTE - NSFALLRISKINTERV_ED_ALL_ED
Assistance OOB with selected safe patient handling equipment if applicable/Assistance with ambulation/Communicate fall risk and risk factors to all staff, patient, and family/Monitor gait and stability/Provide visual cue: yellow wristband, yellow gown, etc/Reinforce activity limits and safety measures with patient and family/Call bell, personal items and telephone in reach/Instruct patient to call for assistance before getting out of bed/chair/stretcher/Non-slip footwear applied when patient is off stretcher/[place] to call system/Physically safe environment - no spills, clutter or unnecessary equipment/Purposeful Proactive Rounding/Room/bathroom lighting operational, light cord in reach

## 2025-02-07 NOTE — ED PROVIDER NOTE - ATTENDING CONTRIBUTION TO CARE
Lamonte 84-year-old male with a past medical history of a left bundle branch block status post pacemaker, hyperlipidemia, hypertension presents to the ED after he fell going [place] the train station.  He states that his foot got caught causing him to lose his balance and he fell on his right side.  He denies any head trauma or neck pain as well as LOC.  He is primarily complaining of right shoulder pain.    Physical exam:  Lamonte elderly male in no acute distress  Head is normocephalic atraumatic, negative Herrera sign, negative raccoon sign, EOMI, PERRLA, no nasal septal hematoma, no hemotympanum  No cervical thoracic or lumbar tenderness to palpation, no step-offs or deformities  Heart is regular rate rhythm without murmurs rubs or gallops  Lungs are clear to auscultation all lung field without wheeze rales rhonchi  Obvious deformity to the right shoulder, pain with range of motion, no open wounds  2+ PT DP and radial pulses  Abdomen is soft nontender nondistended    MDM:  Patient presents to the ED after mechanical fall.  Vital signs are stable and afebrile.  She remains neurovascularly intact.    Blood work reviewed.  Stable anemia at 9.6.  No leukocytosis.  Normal renal function.  No electrode abnormalities.  Chest x-ray without evidence of pneumothorax x-ray of the right shoulder and right humerus shows a right humeral neck fracture that is impacted, orthopedic surgery consulted pending recommendation.  CT head and C-spine shows a trace left parietal subdural hematoma, neurosurgery consulted.  Patient is not on blood thinners.    Patient signed out to Dr. Jarrett Kennedy pending official recommendations from neurosurgery and orthopedic surgery.

## 2025-02-07 NOTE — CONSULT NOTE ADULT - NS ATTEND AMEND GEN_ALL_CORE FT
85 yo M with PMHx of LBBB s/p pacemaker, hypothyroidism, hard of hearing presenting with right shoulder pain x 1 day. CTH revealed trace L. parietal SDH no shift. No/ha/n/v.    CTH 4-6 hr interval scan  get coags; goal inr < 1.4

## 2025-02-07 NOTE — ED ADULT TRIAGE NOTE - CHIEF COMPLAINT QUOTE
Pt had witnessed mechanical fall while going up escalator at [employer] station. No LOC or head trauma. C/o right shoulder and right arm pain. Sling placed by EMS. Hx pacemaker, HTN, HLD, hypothyroid, hard of hearing Pt had witnessed mechanical fall while going up escalator at [employer] station. No LOC or head trauma. C/o right shoulder and right arm pain. Sling placed by EMS. Right shoulder appears higher than left. Hx pacemaker, HTN, HLD, hypothyroid, hard of hearing

## 2025-02-07 NOTE — ED PROVIDER NOTE - OBJECTIVE STATEMENT
85 yo M with PMHx of LBBB s/p pacemaker, HTN, HLD, hypothyroidism, hard of hearing presenting with right shoulder pain x 1 day.  Patient states he was going [place] stairs [place] [employer] [place] when his foot got caught on the escalator.  Pt fell backwards and rolled down the escalator.  Denies head trauma or neck pain. No LOC. No prodromal dizziness.  Endorses right shoulder pain, states he cannot move it.  EMS placed sling on the field.  Has not taken anything for pain.  Denies  CP, SOB, abd pain, HA.

## 2025-02-07 NOTE — CONSULT NOTE ADULT - ASSESSMENT
ASSESSMENT & PLAN  84yMale w/ R proximal humerus fx s/p immobilization.    PLAN  -NWB RUE in a sling  -pain control  -ice/cold compress  -no acute ortho surgery at this time  -f/u outpt with Dr. Dillon in 1 week, call office for appt

## 2025-02-07 NOTE — ED PROVIDER NOTE - NSFOLLOWUPINSTRUCTIONS_ED_ALL_ED_FT
Dislocation    A dislocation is a displacement of the bones that form a joint. This can result from trauma or due to a previous weakening of that joint. Symptoms include pain, swelling, and deformity at the site. Your health care provider has performed maneuvers to place the bones back in place. If a splint or brace was applied, make sure to wear it until you see an orthopedist as instructed.     SEEK IMMEDIATE MEDICAL CARE IF YOU HAVE ANY OF THE FOLLOWING SYMPTOMS: numbness, tingling, pain, weakness, or skin color/temperature change in any part of your body distal to the injury. You were seen in the ED for R arm pain after a fall. You had blood work and imaging done here today. The results are attached within this packet, please bring it with you when you follow up with the orthopedist neurosurgery in 1-2 weeks. Someone from the ED should call you to help facilitate your specialty follow up appointment.  If you have issues obtaining follow up, please call: [phone] to obtain a doctor or specialist who takes your insurance in your area.    Rest, drink plenty of fluids.  Advance activity as tolerated.  Continue all previously prescribed medications as directed.  A fracture is a break in one of your bones. This can occur from a variety of injuries, especially traumatic ones. Symptoms include pain, bruising, or swelling. Do not use the injured limb. If a fracture is in one of the bones below your waist, do not put weight on that limb unless instructed to do so by your healthcare provider. Crutches or a cane may have been provided. A splint or cast may have been applied by your health care provider. Make sure to keep it dry and follow up with an orthopedist as instructed.    SEEK IMMEDIATE MEDICAL CARE IF YOU HAVE ANY OF THE FOLLOWING SYMPTOMS: numbness, tingling, increasing pain, or weakness in any part of the injured limb, fever, vomiting, stiff neck, loss of vision, problems with speech, muscle weakness, loss of balance, trouble walking, passing out, or confusion.

## 2025-02-07 NOTE — ED ADULT NURSE NOTE - CHIEF COMPLAINT QUOTE
Pt had witnessed mechanical fall while going up escalator [place] [employer] [place]. No LOC or head trauma. C/o right shoulder and right arm pain. Sling placed by EMS. Right shoulder appears higher than left. Hx pacemaker, HTN, HLD, hypothyroid, hard of hearing

## 2025-02-07 NOTE — ED ADULT NURSE NOTE - PRO INTERPRETER NEED 2
English Isotretinoin Counseling: Patient should get monthly blood tests, not donate blood, not drive at night if vision affected, not share medication, and not undergo elective surgery for 6 months after tx completed. Side effects reviewed, pt to contact office should one occur.

## 2025-02-07 NOTE — ED ADULT NURSE NOTE - OBJECTIVE STATEMENT
This is a 83 y/o male alert and oriented x 4, with a medical history of pacemaker, HTN. Presented today with a right shoulder injury secondary to a fall, no loc noted. Patient reports that his legs gave way upon entering the elevator on the train station. Lost his balance and fell. Deformity noted on the affected shoulder, sling was applied prior to ED arrival .Equal radial pulses, regular. Cap refill is adequate.  Denies chest pain, no sob, not in any distress. Denies tender abdomen on palpation. Bed in lowest position, side rails up for safety.

## 2025-02-07 NOTE — ED PROVIDER NOTE - CLINICAL SUMMARY MEDICAL DECISION MAKING FREE TEXT BOX
85 yo M with PMHx of LBBB s/p pacemaker, HTN, HLD, hypothyroidism, hard of hearing presenting with right shoulder pain x 1 day. Witnessed mechanical trip and fall down escalator at train station earlier today. No LOC. No prodromal symptoms. VS: HTN. PE: R shoulder and humeral TTP. Peripheral pulses intact, R calf TTP w. 1+ pitting edema.  DDx includes but is not limited to shoulder dislocation, humeral fracture, RLE DVT  Work up: CT head, XR RUE, US RLE, basic labs  Tx: ofirmev  Plan: likely DC for outpatient ortho f/u 83 yo M with PMHx of LBBB s/p pacemaker, HTN, HLD, hypothyroidism, hard of hearing presenting with right shoulder pain x 1 day. Witnessed mechanical trip and fall down escalator at train station earlier today. No LOC. No prodromal symptoms. VS: HTN. PE: R shoulder and humeral TTP. Peripheral pulses intact, R calf TTP w. 1+ pitting edema.  DDx includes but is not limited to shoulder dislocation, humeral fracture, RLE DVT  Work up: CT head, XR RUE, US RLE, basic labs  Tx: ofirmev  Plan: likely DC for outpatient ortho and neurosrg f/u

## 2025-02-07 NOTE — ED PROVIDER NOTE - PATIENT PORTAL LINK FT
You can access the FollowMyHealth Patient Portal offered by [facility] by registering at the following website: [email or web address]. By joining [facility]’s FollowMyHealth portal, you will also be able to view your health information using other applications (apps) compatible with our system.

## 2025-02-07 NOTE — ED PROVIDER NOTE - CARE PLAN
Principal Discharge DX:	Right shoulder pain   1 Principal Discharge DX:	Humeral surgical neck fracture  Secondary Diagnosis:	Traumatic intracranial subdural hematoma

## 2025-02-07 NOTE — ED ADULT NURSE NOTE - TEMPLATE
Goal Outcome Evaluation:  Plan of Care Reviewed With: patient  Progress: improving  Outcome Summary: VSS, on RA. voiding well. pain controlled with PRN meds. lap sites are CDI. bowel sounds are present. Pt is currently pumping and dumping breastmilk.   General

## 2025-02-07 NOTE — ED PROVIDER NOTE - PROGRESS NOTE DETAILS
Rpt CT head stable. Pt placed in R arm sling. Labs and imaging results reviewed with pt at bedside. All questions answered. Care instructions and return precautions discussed.  Patient is stable and ready for DC. Will follow with orthopedist outpatient in 1-2 weeks.

## 2025-02-07 NOTE — CONSULT NOTE ADULT - PROBLEM SELECTOR RECOMMENDATION 9
- repeat CTH in 4-6 hours from the initial scan for stability  - avoid AC or AP  - please obtain coags    [id number]    Case discussed with attending neurosurgeon Dr. Goss

## 2025-02-07 NOTE — CONSULT NOTE ADULT - ASSESSMENT
83 yo M with PMHx of LBBB s/p pacemaker, HTN, HLD, hypothyroidism, hard of hearing presenting with right shoulder pain x 1 day. CTH revealed trace L. parietal SDH no shift.  83 yo M with PMHx of LBBB s/p pacemaker, HTN, HLD, hypothyroidism, hard of hearing presenting with right shoulder pain x 1 day. CTH revealed trace L. parietal SDH no shift. No/ha/n/v.

## 2025-02-07 NOTE — CONSULT NOTE ADULT - SUBJECTIVE AND OBJECTIVE BOX
NEUROSURGERY CONSULT    HPI: 85 yo M with PMHx of LBBB s/p pacemaker, HTN, HLD, hypothyroidism, hard of hearing presenting with right shoulder pain x 1 day.  Patient states he [place] [employer] [place] when his foot got caught on the escalator.  Pt fell backwards and rolled down the escalator.  Denies head trauma or neck pain. No LOC. No prodromal dizziness.  Endorses right shoulder pain, states he cannot move it.  EMS placed sling on the field.  Has not taken anything for pain.  Denies  CP, SOB, abd pain, HA.    RADIOLOGY:     CTH/ CT cervical spine 2/7/25    IMPRESSION:    CT HEAD:  Trace left parietal subdural hematoma.  No mass effect or midline shift.    CT CERVICAL SPINE:  No acute fracture or traumatic subluxation.    Multi-level degenerative changes.    Findings discussed with Dr. WILL Mei  by Dr. Lo on 2/7/2025 11:06 PM   with read back confirmation.    --- End of Report ---          SEGUN LO MD; Resident Radiologist  This document hasbeen electronically signed.  CHRISTINA MARTIN MD; Attending Radiologist  This document has been electronically signed. Feb 7 2025 11:39PM      MEDS:      Vital Signs Last 24 Hrs  T(C): 36.5 (07 Feb 2025 23:28), Max: 36.8 (07 Feb 2025 17:57)  T(F): 97.7 (07 Feb 2025 23:28), Max: 98.3 (07 Feb 2025 17:57)  HR: 51 (07 Feb 2025 23:28) (51 - 80)  BP: 119/79 (07 Feb 2025 23:28) (119/79 - 152/91)  BP(mean): --  RR: 17 (07 Feb 2025 23:28) (16 - 17)  SpO2: 98% (07 Feb 2025 23:28) (98% - 99%)    Parameters below as of 07 Feb 2025 23:28  Patient On (Oxygen Delivery Method): room air        LABS:                        9.6    6.39  )-----------( 213      ( 07 Feb 2025 19:35 )             29.5     02-07    136  |  99  |  16  ----------------------------<  104[H]  5.2   |  24  |  1.04    Ca    9.2      07 Feb 2025 19:35    TPro  7.4  /  Alb  4.1  /  TBili  <0.2  /  DBili  x   /  AST  41[H]  /  ALT  13  /  AlkPhos  66  02-07          PHYSICAL EXAM:  AOx3, age appropriate, follows commands  PERRL, EOMI, face symmetrical   CALL x 4 with good strength   Sensation intact to light touch   No pronator drift on LUE   unable to assess right shoulder given pain

## 2025-02-07 NOTE — ED PROVIDER NOTE - PHYSICAL EXAMINATION
Const: Awake, alert, no acute distress.  Appears well.  Moving comfortably on stretcher. Sling in place on R arm.  HEENT: NC/AT.  Moist mucous membranes.  Eyes: Extraocular movements intact b/l.  No scleral icterus.  Neck: Full ROM without pain. Supple.  Cardiac: Regular rate and regular rhythm. S1 S2 present. Peripheral pulses intact and equal bl UE.   Resp: No evidence of respiratory distress.  No stridor or wheeze.  Good air entry b/l, breath sounds clear to auscultation b/l.  Abd: Non-distended. Soft, nontender  Skin: Normal coloration.  No rashes, abrasions or lacerations. No ecchymosis.  MSKT: R shoulder and humerus TTP, R shoulder appears raised compared to L, R calf TTP w. 1+ pitting edema.   Neuro: Awake, alert & oriented x 3.  Moves all extremities symmetrically.  No obvious focal deficits.

## 2025-02-08 VITALS
OXYGEN SATURATION: 98 % | HEART RATE: 69 BPM | RESPIRATION RATE: 17 BRPM | SYSTOLIC BLOOD PRESSURE: 124 MMHG | TEMPERATURE: 99 F | DIASTOLIC BLOOD PRESSURE: 76 MMHG

## 2025-02-08 DIAGNOSIS — I62.00 NONTRAUMATIC SUBDURAL HEMORRHAGE, UNSPECIFIED: ICD-10-CM

## 2025-02-08 PROCEDURE — 73020 X-RAY EXAM OF SHOULDER: CPT | Mod: 26,RT

## 2025-02-08 PROCEDURE — 73070 X-RAY EXAM OF ELBOW: CPT | Mod: 26,RT

## 2025-02-08 PROCEDURE — 70450 CT HEAD/BRAIN W/O DYE: CPT | Mod: 26

## 2025-02-08 RX ORDER — MORPHINE SULFATE 60 MG/1
4 TABLET, FILM COATED, EXTENDED RELEASE ORAL ONCE
Refills: 0 | Status: DISCONTINUED | OUTPATIENT
Start: 2025-02-08 | End: 2025-02-08

## 2025-02-08 RX ADMIN — MORPHINE SULFATE 4 MILLIGRAM(S): 60 TABLET, FILM COATED, EXTENDED RELEASE ORAL at 01:49

## 2025-02-08 NOTE — CHART NOTE - NSCHARTNOTEFT_GEN_A_CORE
Imaging reviewed with attending. Interval 6 Hour CTH stable. No acute neurosurgical intervention at this time. Pending coags  F/U outpatient with Dr. Thibodeaux in 1-2 weeks after discharge.     < from: CT Head No Cont (02.08.25 @ 03:36) >    IMPRESSION:  Stable exam.    < end of copied text >      [id number]  Case discussed with attending neurosurgeon Dr. Goss

## 2025-02-08 NOTE — ED ADULT NURSE REASSESSMENT NOTE - NS ED NURSE REASSESS COMMENT FT1
Pt is A+O4, resting in stretcher. Spontaneous respirations are even and unlabored on room air. Speech is clear, pt able to speak in full sentences. Pt offers no complaints at this time. Awaiting social work consult
patient awoke from resting, patient state 10/10 pain, patient remains at bedrest, pain medication ordered.
patient remains in bed, no apparent distress at this time, breathing even and unlabored, patient remains sleeping. safety maintained.
Yes

## 2025-02-08 NOTE — PROVIDER CONTACT NOTE (OTHER) - ASSESSMENT
TYRONE spoke with the pt at bedside to confirm his home address of [address] [place] [address], [address], [place] [address]. The pt reported the address listed in his chart is his brothers address.   TYRONE arranged for S transport with [facility], ([phone]), trip #[id number].

## 2025-02-21 ENCOUNTER — NON-APPOINTMENT (OUTPATIENT)
Age: 85
End: 2025-02-21

## 2025-02-21 DIAGNOSIS — S06.5XAA TRAUMATIC SUBDURAL HEMORRHAGE WITH LOSS OF CONSCIOUSNESS STATUS UNKNOWN, INITIAL ENCOUNTER: ICD-10-CM

## 2025-03-03 ENCOUNTER — APPOINTMENT (OUTPATIENT)
Dept: NEUROSURGERY | Facility: CLINIC | Age: 85
End: 2025-03-03

## 2025-06-24 ENCOUNTER — INPATIENT (INPATIENT)
Facility: HOSPITAL | Age: 85
LOS: 4 days | Discharge: HOME | DRG: 683 | End: 2025-06-29
Attending: GENERAL PRACTICE | Admitting: GENERAL PRACTICE
Payer: MEDICARE

## 2025-06-24 VITALS
DIASTOLIC BLOOD PRESSURE: 77 MMHG | HEIGHT: 60 IN | WEIGHT: 82.01 LBS | TEMPERATURE: 98 F | HEART RATE: 94 BPM | OXYGEN SATURATION: 98 % | SYSTOLIC BLOOD PRESSURE: 114 MMHG | RESPIRATION RATE: 20 BRPM

## 2025-06-24 LAB
ALBUMIN SERPL ELPH-MCNC: 4.5 G/DL — SIGNIFICANT CHANGE UP (ref 3.3–5)
ALP SERPL-CCNC: 75 U/L — SIGNIFICANT CHANGE UP (ref 40–120)
ALT FLD-CCNC: 10 U/L — SIGNIFICANT CHANGE UP (ref 10–45)
ANION GAP SERPL CALC-SCNC: 14 MMOL/L — SIGNIFICANT CHANGE UP (ref 5–17)
AST SERPL-CCNC: 31 U/L — SIGNIFICANT CHANGE UP (ref 10–40)
BASOPHILS # BLD AUTO: 0.05 K/UL — SIGNIFICANT CHANGE UP (ref 0–0.2)
BASOPHILS NFR BLD AUTO: 0.9 % — SIGNIFICANT CHANGE UP (ref 0–2)
BILIRUB SERPL-MCNC: 0.4 MG/DL — SIGNIFICANT CHANGE UP (ref 0.2–1.2)
BUN SERPL-MCNC: 17 MG/DL — SIGNIFICANT CHANGE UP (ref 7–23)
CALCIUM SERPL-MCNC: 10 MG/DL — SIGNIFICANT CHANGE UP (ref 8.4–10.5)
CHLORIDE SERPL-SCNC: 102 MMOL/L — SIGNIFICANT CHANGE UP (ref 96–108)
CO2 SERPL-SCNC: 22 MMOL/L — SIGNIFICANT CHANGE UP (ref 22–31)
CREAT SERPL-MCNC: 1.72 MG/DL — HIGH (ref 0.5–1.3)
EGFR: 38 ML/MIN/1.73M2 — LOW
EGFR: 38 ML/MIN/1.73M2 — LOW
EOSINOPHIL # BLD AUTO: 0.17 K/UL — SIGNIFICANT CHANGE UP (ref 0–0.5)
EOSINOPHIL NFR BLD AUTO: 3.1 % — SIGNIFICANT CHANGE UP (ref 0–6)
GLUCOSE SERPL-MCNC: 94 MG/DL — SIGNIFICANT CHANGE UP (ref 70–99)
HCT VFR BLD CALC: 31.6 % — LOW (ref 39–50)
HGB BLD-MCNC: 10 G/DL — LOW (ref 13–17)
IMM GRANULOCYTES # BLD AUTO: 0.02 K/UL — SIGNIFICANT CHANGE UP (ref 0–0.07)
IMM GRANULOCYTES NFR BLD AUTO: 0.4 % — SIGNIFICANT CHANGE UP (ref 0–0.9)
LYMPHOCYTES # BLD AUTO: 1.05 K/UL — SIGNIFICANT CHANGE UP (ref 1–3.3)
LYMPHOCYTES NFR BLD AUTO: 19.3 % — SIGNIFICANT CHANGE UP (ref 13–44)
MCHC RBC-ENTMCNC: 28.9 PG — SIGNIFICANT CHANGE UP (ref 27–34)
MCHC RBC-ENTMCNC: 31.6 G/DL — LOW (ref 32–36)
MCV RBC AUTO: 91.3 FL — SIGNIFICANT CHANGE UP (ref 80–100)
MONOCYTES # BLD AUTO: 0.56 K/UL — SIGNIFICANT CHANGE UP (ref 0–0.9)
MONOCYTES NFR BLD AUTO: 10.3 % — SIGNIFICANT CHANGE UP (ref 2–14)
NEUTROPHILS # BLD AUTO: 3.6 K/UL — SIGNIFICANT CHANGE UP (ref 1.8–7.4)
NEUTROPHILS NFR BLD AUTO: 66 % — SIGNIFICANT CHANGE UP (ref 43–77)
NRBC # BLD AUTO: 0 K/UL — SIGNIFICANT CHANGE UP (ref 0–0)
NRBC # FLD: 0 K/UL — SIGNIFICANT CHANGE UP (ref 0–0)
NRBC BLD AUTO-RTO: 0 /100 WBCS — SIGNIFICANT CHANGE UP (ref 0–0)
PLATELET # BLD AUTO: 217 K/UL — SIGNIFICANT CHANGE UP (ref 150–400)
PMV BLD: 9.8 FL — SIGNIFICANT CHANGE UP (ref 7–13)
POTASSIUM SERPL-MCNC: 4.8 MMOL/L — SIGNIFICANT CHANGE UP (ref 3.5–5.3)
POTASSIUM SERPL-SCNC: 4.8 MMOL/L — SIGNIFICANT CHANGE UP (ref 3.5–5.3)
PROT SERPL-MCNC: 8.2 G/DL — SIGNIFICANT CHANGE UP (ref 6–8.3)
RBC # BLD: 3.46 M/UL — LOW (ref 4.2–5.8)
RBC # FLD: 15.5 % — HIGH (ref 10.3–14.5)
SODIUM SERPL-SCNC: 138 MMOL/L — SIGNIFICANT CHANGE UP (ref 135–145)
TROPONIN T, HIGH SENSITIVITY RESULT: 18 NG/L — SIGNIFICANT CHANGE UP (ref 0–51)
TROPONIN T, HIGH SENSITIVITY RESULT: 19 NG/L — SIGNIFICANT CHANGE UP (ref 0–51)
WBC # BLD: 5.45 K/UL — SIGNIFICANT CHANGE UP (ref 3.8–10.5)
WBC # FLD AUTO: 5.45 K/UL — SIGNIFICANT CHANGE UP (ref 3.8–10.5)

## 2025-06-24 PROCEDURE — 93010 ELECTROCARDIOGRAM REPORT: CPT

## 2025-06-24 PROCEDURE — 70450 CT HEAD/BRAIN W/O DYE: CPT | Mod: 26

## 2025-06-24 PROCEDURE — 99285 EMERGENCY DEPT VISIT HI MDM: CPT | Mod: FS

## 2025-06-24 RX ORDER — MECLIZINE HCL 12.5 MG
25 TABLET ORAL ONCE
Refills: 0 | Status: COMPLETED | OUTPATIENT
Start: 2025-06-24 | End: 2025-06-24

## 2025-06-24 RX ADMIN — Medication 25 MILLIGRAM(S): at 19:23

## 2025-06-24 RX ADMIN — Medication 1000 MILLILITER(S): at 19:23

## 2025-06-24 NOTE — ED ADULT NURSE NOTE - OBJECTIVE STATEMENT
86 y/o male came to the ED with complaints of not feeling well and feels like he is going to die. Complaints of the room spinning for one day happened about 3 times. Weakness x 3 days. History of a subdural hematoma. [place]. Denies CP, SOB, headahce, fevers, chills, N, V, D, urinary symptoms.

## 2025-06-24 NOTE — ED PROVIDER NOTE - CLINICAL SUMMARY MEDICAL DECISION MAKING FREE TEXT BOX
85-year-old male PMH hypertension hyperlipidemia hypothyroidism left bundle branch block status post pacemaker.  Presenting with multiple medical concerns  Patient states he has been having vertiginous symptoms room spinning for the last day patient has had 3 episodes in total.  Patient states he has felt weak for about 3 days.  Patient states all the symptoms predate his last admission in [facility] which patient had a subdural hematoma.  Patient states he lost his hearing after a traumatic accident which involved a head injury.  Patient denies chest pain shortness of breath palpitations fevers chills nausea vomiting diarrhea dysuria.  Patient states he just feels weak and unbalanced due to the vertiginous symptoms and is afraid of falling.      Pt concerning for vertigo and possible infectious pathology and electrolyte imbalance/ Will treat patient symptomatically and reassess will obtain Basic labs CT had cardiac enzymes urinalysis and will screen for syphilis.

## 2025-06-24 NOTE — ED PROVIDER NOTE - PROGRESS NOTE DETAILS
I received signout this patient at the usual time pending labs.  Labs significant for troponin of 18, creatinine 1.72, BUN:Cr ~10.  Repeat troponin ordered.  Postvoid residual bladder scan ordered, TTE from 2021 reviewed mild diastolic dysfunction, normal LV systolic function, normal RV size and function.  Will write patient for gentle IV fluids. Pt currently at CT. -Angelo Andre PA-C CT head resulted nonactionable.  Patient reevaluated at bedside has fatigable rightward horizontal nystagmus on exam.  EACs clear TMs pearly gray no vesicles or lesions bilaterally.  Discussed plan with patient for IV hydration repeat labs in the morning in CDU, frequent evaluation, further evaluation if patient still symptomatic and feels unwell for discharge.  Discussed with CDU PA who will see patient.  Patient is agreeable for CDU overnight.  Will give additional low dose of meclizine. -Angelo Andre PA-C

## 2025-06-24 NOTE — ED PROVIDER NOTE - PHYSICAL EXAMINATION
GENERAL: NAD, lying in bed comfortably  HEAD:  Atraumatic, normocephalic  EYES: EOMI, PERRLA, conjunctiva and sclera clear  NECK: Supple, trachea midline, no JVD  HEART: Regular rate and rhythm, no murmurs, rubs, or gallops  LUNGS: Unlabored respirations.  Clear to auscultation bilaterally, no crackles, wheezing, or rhonchi  ABDOMEN: Soft, nontender, nondistended, +BS  EXTREMITIES: 2+ peripheral pulses bilaterally. No clubbing, cyanosis, or edema  NERVOUS SYSTEM:  A&Ox3, hard of hearing other CN intact, 5/5 muscle strength coordination intact, gait intact  SKIN: No rashes or lesions

## 2025-06-24 NOTE — ED ADULT TRIAGE NOTE - NS ED TRIAGE AVPU SCALE
never
Alert-The patient is alert, awake and responds to voice. The patient is oriented to time, place, and person. The triage nurse is able to obtain subjective information.

## 2025-06-24 NOTE — ED PROVIDER NOTE - DISPOSITION TYPE
Outpatient Speech Therapy    [x] [place]  Phone: [phone]  Fax: [phone]      [] [place]  Phone: [phone]  Fax: [phone] THERAPY DAILY PROGRESS NOTE    Patient: Dinh Cage     History Number: [id number]  Age: 9 y.o.     : 2012     PCP: Zully Foy NP   Onset date: 14  Referring doctor: Sadia Lamb NP  Diagnosis:  1.  Developmental delay  2.  Autism  3.  Speech delay  4.  Receptive-expressive language disorder  5.  Cognitive-communication impairment        Precautions:  universal    Date: 2017     Time in: 04:35 PM  Visit:  11/      Time out: 05:05 PM   Total Visits: 11  Insurance information:  [employer] of care signed (Y/N): y  Next re-certification due by:  2017    PAIN  [x]No     []Yes       Location: N/A   Pain Rating (0-10 pain scale): 0  Pain Description: N/A     G-Code (if applicable):     Date / Visit # G-Code Applied: 2017  Visit #1       Next due by: Visit #10        Subjective report:     Nicolette Franco was seen by OT prior to speech this date. He was pleasant and compliant with all tasks. Goal 1: Produce /f/ in the medial word position at the word level and the initial and final word positions at the sentence level with 80% accuracy independently. Words:   Initial:  =60% independently    Sentences:  Initial:  =90% independently, 100% with minimal cues    Final: 10/20=50% independently, 100% with minimal cues   Goal 2: Nicolette Franco will demonstrate an understanding of prepositions/location in 4/5 opportunities. NA-goal not addressed this date   Goal 3: Nicolette Franco will explain how 2 objects go together in 8/10 trials. =18% independently         Goal 4: Nicolette Franco will ask for help when needed in 4/5 opportunities.  3/5        Patient education/  home program         New Education provided to patient/ family/ caregiver   [] Yes              [x] No   Comments:   Continued review of prior education:  Method of Education:   [x]
OBSERVATION

## 2025-06-24 NOTE — ED PROVIDER NOTE - ATTENDING CONTRIBUTION TO CARE
Emergency Medicine Attending MD [place]:  patient seen and evaluated with the resident.  I was present for key portions of the History & Physical, and I agree with the Impression & Plan.    Patient is a 85M, room-spinning sensation x 2days, ambulated into Gold room 5 w/o assistance.  Associated symptoms: No headache, no neck pain, no weakness, no numbness, no blurry vision, no double vision, no slurred speech.    Medical history: HTN, HLD, hypothyroidismn, PPM, traumatic SDH 2/2025 (no intervention).  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  Skin:  Warm and dry as visualized  Psych: appropriate    Medical Decision Making / Differential Diagnosis:  HPI concerning for BPPV, versus recurrent subdural hemorrhage.  Plan: Basic labs, urinalysis, 25 mg meclizine, reassess.  Screening ECG. Emergency Medicine Attending MD Beach:  patient seen and evaluated with the resident.  I was present for key portions of the History & Physical, and I agree with the Impression & Plan.    Patient is a 85M, room-spinning sensation x 2days, ambulated into Gold room 5 w/o assistance.  Associated symptoms: No headache, no neck pain, no weakness, no numbness, no blurry vision, no double vision, no slurred speech.    Medical history: HTN, HLD, hypothyroidismn, PPM, traumatic SDH 2/2025 (no intervention).  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  Skin:  Warm and dry as visualized  Psych: appropriate    Medical Decision Making / Differential Diagnosis:  HPI concerning for BPPV, versus recurrent subdural hemorrhage.  Plan: Basic labs, urinalysis, 25 mg meclizine, reassess.  Screening ECG.    ECG directly visualized by me and shows atrial sensed rhythm, rate 67, , , QTc 467.  No ST elevations or depressions. Emergency Medicine Attending MD [place]:  patient seen and evaluated with the resident.  I was present for key portions of the History & Physical, and I agree with the Impression & Plan.    Patient is a 85M, room-spinning sensation x 2days, ambulated into Gold room 5 w/o assistance.  "Feels like I am seasick."  No light-headedness, no sensation like he is going to pass out.    Associated symptoms: No headache, no neck pain, no weakness, no numbness, no blurry vision, no double vision, no slurred speech.    Medical history: HTN, HLD, hypothyroidismn, PPM, traumatic SDH 2/2025 (no intervention).  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  Skin:  Warm and dry as visualized  Psych: appropriate    Medical Decision Making / Differential Diagnosis:  HPI concerning for BPPV, versus recurrent subdural hemorrhage.  Plan: Basic labs, urinalysis, 25 mg meclizine, reassess.  Screening ECG.    Low suspicion for syncope; HR, BP normal.  ECG shows normal atrial-paced rhythm.      ECG directly visualized by me and shows atrial sensed rhythm, rate 67, , , QTc 467.  No ST elevations or depressions.

## 2025-06-25 DIAGNOSIS — N17.9 ACUTE KIDNEY FAILURE, UNSPECIFIED: ICD-10-CM

## 2025-06-25 DIAGNOSIS — R53.1 WEAKNESS: ICD-10-CM

## 2025-06-25 DIAGNOSIS — I10 ESSENTIAL (PRIMARY) HYPERTENSION: ICD-10-CM

## 2025-06-25 DIAGNOSIS — R42 DIZZINESS AND GIDDINESS: ICD-10-CM

## 2025-06-25 DIAGNOSIS — N39.0 URINARY TRACT INFECTION, SITE NOT SPECIFIED: ICD-10-CM

## 2025-06-25 DIAGNOSIS — D64.9 ANEMIA, UNSPECIFIED: ICD-10-CM

## 2025-06-25 DIAGNOSIS — H91.90 UNSPECIFIED HEARING LOSS, UNSPECIFIED EAR: ICD-10-CM

## 2025-06-25 LAB
ALBUMIN SERPL ELPH-MCNC: 3.5 G/DL — SIGNIFICANT CHANGE UP (ref 3.3–5)
ALP SERPL-CCNC: 68 U/L — SIGNIFICANT CHANGE UP (ref 40–120)
ALT FLD-CCNC: 7 U/L — LOW (ref 10–45)
ANION GAP SERPL CALC-SCNC: 12 MMOL/L — SIGNIFICANT CHANGE UP (ref 5–17)
APPEARANCE UR: ABNORMAL
AST SERPL-CCNC: 19 U/L — SIGNIFICANT CHANGE UP (ref 10–40)
BACTERIA # UR AUTO: ABNORMAL /HPF
BILIRUB SERPL-MCNC: 0.5 MG/DL — SIGNIFICANT CHANGE UP (ref 0.2–1.2)
BILIRUB UR-MCNC: NEGATIVE — SIGNIFICANT CHANGE UP
BUN SERPL-MCNC: 14 MG/DL — SIGNIFICANT CHANGE UP (ref 7–23)
CALCIUM SERPL-MCNC: 8.3 MG/DL — LOW (ref 8.4–10.5)
CAST: 6 /LPF — HIGH (ref 0–4)
CHLORIDE SERPL-SCNC: 103 MMOL/L — SIGNIFICANT CHANGE UP (ref 96–108)
CO2 SERPL-SCNC: 19 MMOL/L — LOW (ref 22–31)
COLOR SPEC: YELLOW — SIGNIFICANT CHANGE UP
CREAT SERPL-MCNC: 1.22 MG/DL — SIGNIFICANT CHANGE UP (ref 0.5–1.3)
DIFF PNL FLD: NEGATIVE — SIGNIFICANT CHANGE UP
EGFR: 58 ML/MIN/1.73M2 — LOW
EGFR: 58 ML/MIN/1.73M2 — LOW
GLUCOSE SERPL-MCNC: 122 MG/DL — HIGH (ref 70–99)
GLUCOSE UR QL: NEGATIVE MG/DL — SIGNIFICANT CHANGE UP
HYALINE CASTS # UR AUTO: PRESENT
KETONES UR QL: ABNORMAL MG/DL
LEUKOCYTE ESTERASE UR-ACNC: ABNORMAL
NITRITE UR-MCNC: POSITIVE
PH UR: 6.5 — SIGNIFICANT CHANGE UP (ref 5–8)
POTASSIUM SERPL-MCNC: 4.2 MMOL/L — SIGNIFICANT CHANGE UP (ref 3.5–5.3)
POTASSIUM SERPL-SCNC: 4.2 MMOL/L — SIGNIFICANT CHANGE UP (ref 3.5–5.3)
PROT SERPL-MCNC: 6.4 G/DL — SIGNIFICANT CHANGE UP (ref 6–8.3)
PROT UR-MCNC: SIGNIFICANT CHANGE UP MG/DL
RBC CASTS # UR COMP ASSIST: 1 /HPF — SIGNIFICANT CHANGE UP (ref 0–4)
REVIEW: SIGNIFICANT CHANGE UP
SODIUM SERPL-SCNC: 134 MMOL/L — LOW (ref 135–145)
SP GR SPEC: 1.02 — SIGNIFICANT CHANGE UP (ref 1–1.03)
SQUAMOUS # UR AUTO: 0 /HPF — SIGNIFICANT CHANGE UP (ref 0–5)
T PALLIDUM AB TITR SER: NEGATIVE — SIGNIFICANT CHANGE UP
UROBILINOGEN FLD QL: 1 MG/DL — SIGNIFICANT CHANGE UP (ref 0.2–1)
WBC UR QL: 21 /HPF — HIGH (ref 0–5)

## 2025-06-25 PROCEDURE — 97161 PT EVAL LOW COMPLEX 20 MIN: CPT

## 2025-06-25 PROCEDURE — 86780 TREPONEMA PALLIDUM: CPT

## 2025-06-25 PROCEDURE — 81001 URINALYSIS AUTO W/SCOPE: CPT

## 2025-06-25 PROCEDURE — 80053 COMPREHEN METABOLIC PANEL: CPT

## 2025-06-25 PROCEDURE — 70450 CT HEAD/BRAIN W/O DYE: CPT

## 2025-06-25 PROCEDURE — 99223 1ST HOSP IP/OBS HIGH 75: CPT | Mod: FS

## 2025-06-25 PROCEDURE — 84484 ASSAY OF TROPONIN QUANT: CPT

## 2025-06-25 PROCEDURE — 85025 COMPLETE CBC W/AUTO DIFF WBC: CPT

## 2025-06-25 PROCEDURE — 93281 PM DEVICE PROGR EVAL MULTI: CPT | Mod: 26

## 2025-06-25 PROCEDURE — 87086 URINE CULTURE/COLONY COUNT: CPT

## 2025-06-25 RX ORDER — CEFTRIAXONE 500 MG/1
1000 INJECTION, POWDER, FOR SOLUTION INTRAMUSCULAR; INTRAVENOUS EVERY 24 HOURS
Refills: 0 | Status: COMPLETED | OUTPATIENT
Start: 2025-06-26 | End: 2025-06-28

## 2025-06-25 RX ORDER — MECLIZINE HCL 12.5 MG
12.5 TABLET ORAL ONCE
Refills: 0 | Status: COMPLETED | OUTPATIENT
Start: 2025-06-25 | End: 2025-06-25

## 2025-06-25 RX ORDER — CEFTRIAXONE 500 MG/1
1000 INJECTION, POWDER, FOR SOLUTION INTRAMUSCULAR; INTRAVENOUS ONCE
Refills: 0 | Status: COMPLETED | OUTPATIENT
Start: 2025-06-25 | End: 2025-06-25

## 2025-06-25 RX ORDER — AMLODIPINE BESYLATE 10 MG/1
2.5 TABLET ORAL DAILY
Refills: 0 | Status: DISCONTINUED | OUTPATIENT
Start: 2025-06-25 | End: 2025-06-29

## 2025-06-25 RX ORDER — ONDANSETRON HCL/PF 4 MG/2 ML
4 VIAL (ML) INJECTION EVERY 8 HOURS
Refills: 0 | Status: DISCONTINUED | OUTPATIENT
Start: 2025-06-25 | End: 2025-06-29

## 2025-06-25 RX ORDER — MECLIZINE HCL 12.5 MG
25 TABLET ORAL DAILY
Refills: 0 | Status: DISCONTINUED | OUTPATIENT
Start: 2025-06-25 | End: 2025-06-28

## 2025-06-25 RX ORDER — ACETAMINOPHEN 500 MG/5ML
650 LIQUID (ML) ORAL EVERY 6 HOURS
Refills: 0 | Status: DISCONTINUED | OUTPATIENT
Start: 2025-06-25 | End: 2025-06-29

## 2025-06-25 RX ORDER — MAGNESIUM, ALUMINUM HYDROXIDE 200-200 MG
30 TABLET,CHEWABLE ORAL EVERY 4 HOURS
Refills: 0 | Status: DISCONTINUED | OUTPATIENT
Start: 2025-06-25 | End: 2025-06-29

## 2025-06-25 RX ORDER — CARVEDILOL 3.12 MG/1
12.5 TABLET, FILM COATED ORAL EVERY 12 HOURS
Refills: 0 | Status: DISCONTINUED | OUTPATIENT
Start: 2025-06-25 | End: 2025-06-29

## 2025-06-25 RX ORDER — MELATONIN 5 MG
3 TABLET ORAL AT BEDTIME
Refills: 0 | Status: DISCONTINUED | OUTPATIENT
Start: 2025-06-25 | End: 2025-06-29

## 2025-06-25 RX ORDER — OMEPRAZOLE 20 MG/1
1 CAPSULE, DELAYED RELEASE ORAL
Refills: 0 | DISCHARGE

## 2025-06-25 RX ADMIN — Medication 500 MILLILITER(S): at 01:09

## 2025-06-25 RX ADMIN — CARVEDILOL 12.5 MILLIGRAM(S): 3.12 TABLET, FILM COATED ORAL at 11:13

## 2025-06-25 RX ADMIN — AMLODIPINE BESYLATE 2.5 MILLIGRAM(S): 10 TABLET ORAL at 22:05

## 2025-06-25 RX ADMIN — Medication 75 MILLILITER(S): at 02:55

## 2025-06-25 RX ADMIN — Medication 12.5 MILLIGRAM(S): at 01:09

## 2025-06-25 RX ADMIN — CEFTRIAXONE 100 MILLIGRAM(S): 500 INJECTION, POWDER, FOR SOLUTION INTRAMUSCULAR; INTRAVENOUS at 11:13

## 2025-06-25 RX ADMIN — CARVEDILOL 12.5 MILLIGRAM(S): 3.12 TABLET, FILM COATED ORAL at 22:05

## 2025-06-25 NOTE — ED CDU PROVIDER INITIAL DAY NOTE - ATTENDING APP SHARED VISIT CONTRIBUTION OF CARE
MD [place]:  I have personally performed a face to face diagnostic evaluation on this patient with the PA.  I have reviewed the ACP note and agree with the history, exam, and plan of care, except as noted.  History and Exam by me shows an 85M, room-spinning sensation x 2days, ambulated into Gold room 5 w/o assistance.  "Feels like I am seasick."  No light-headedness, no sensation like he is going to pass out.    Associated symptoms: No headache, no neck pain, no weakness, no numbness, no blurry vision, no double vision, no slurred speech.    Medical history: HTN, HLD, hypothyroidismn, PPM, traumatic SDH 2/2025 (no intervention).  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  Skin:  Warm and dry as visualized  Psych: appropriate    Medical Decision Making / Differential Diagnosis:  HPI concerning for BPPV, versus recurrent subdural hemorrhage.  In the ED:  Basic labs, urinalysis, 25 mg meclizine, reassess.  Screening ECG.  Labs showed CINDY.  Will consider CDU for hydration, reassessment.     Low suspicion for syncope; HR, BP normal.  ECG shows normal atrial-paced rhythm.      ECG directly visualized by me and shows atrial sensed rhythm, rate 67, , , QTc 467.  No ST elevations or depressions.    Plan:  CDU for hydration, clinical reassessment.

## 2025-06-25 NOTE — ED CDU PROVIDER INITIAL DAY NOTE - PHYSICAL EXAMINATION
Gen: Frail appearing elderly male. Hard of hearing. AAO x 3, NAD  Skin: No rashes or lesions  HEENT: NC/AT, PERRLA, EOMI, MMM  Resp: unlabored CTAB  Cardiac: rrr s1s2, no murmurs, rubs or gallops  GI: ND, +BS, Soft, NT  Ext: no pedal edema, FROM in all extremities  Neuro: no focal deficits

## 2025-06-25 NOTE — ED CDU PROVIDER INITIAL DAY NOTE - OBJECTIVE STATEMENT
85-year-old male PMH hypertension hyperlipidemia hypothyroidism left bundle branch block status post pacemaker presents to ED w/multiple medical concerns  Has been having room spinning dizziness intermittently the last few days, episodes in total.  Additionally has felt weak for the last 3 days. Reports has "lost his hearing" every since seen at [facility] in february and found to have traumatic subdural hematoma. Reports feels overall weak and off-balance when dizziness occurs and is afraid of falling. No current dizziness at this time. Denies chest pain, shortness of breath, fever/chills, n/v/d, urinary sxs.     In ED: Labs notable for CINDY with creatinine 1.72.  Troponin 18 and 19 on repeat.  CT head performed which did not show any acute pathology.  Trace chronic subdural hematoma again noted but not significantly changed from previous.  Patient sent to CDU for gentle IV fluid hydration and repeat labs to assess improvement of CINDY and PT eval. Case d/w ED attending.

## 2025-06-25 NOTE — ED CDU PROVIDER DISPOSITION NOTE - CLINICAL COURSE
85-year-old male PMH hypertension hyperlipidemia hypothyroidism left bundle branch block status post pacemaker presents to ED w/multiple medical concerns  	Has been having room spinning dizziness intermittently the last few days, episodes in total.  Additionally has felt weak for the last 3 days. Reports has "lost his hearing" every since seen at [facility] in february and found to have traumatic subdural hematoma. Reports feels overall weak and off-balance when dizziness occurs and is afraid of falling. No current dizziness at this time. Denies chest pain, shortness of breath, fever/chills, n/v/d, urinary sxs.   In ED: Labs notable for CINDY with creatinine 1.72.  Troponin 18 and 19 on repeat.  CT head performed which did not show any acute pathology.  Trace chronic subdural hematoma again noted but not significantly changed from previous.  Patient sent to CDU for gentle IV fluid hydration and repeat labs to assess improvement of CINDY and PT eval.  Prior to coming to CDU pt had 4 beats wide complex tachycardia, resolved, sleeping at time of event. Given this plan for EP eval in AM to interrogate PPM  In CDU _____________. 85-year-old male PMH hypertension hyperlipidemia hypothyroidism left bundle branch block status post pacemaker presents to ED w/multiple medical concerns. Reprots has been having room spinning dizziness intermittently the last few days, episodes in total.  Additionally has felt weak for the last 3 days. Reports has "lost his hearing" every since seen at [facility] in february and found to have traumatic subdural hematoma. Reports feels overall weak and off-balance when dizziness occurs and is afraid of falling. No current dizziness at this time. Denies chest pain, shortness of breath, fever/chills, n/v/d, urinary sxs.   In ED: Labs notable for CINDY with creatinine 1.72.  Troponin 18 and 19 on repeat.  CT head performed which did not show any acute pathology.  Trace chronic subdural hematoma again noted but not significantly changed from previous.  Patient sent to CDU for gentle IV fluid hydration and repeat labs to assess improvement of CINDY and PT eval.  Prior to coming to CDU pt had 4 beats wide complex tachycardia, resolved, sleeping at time of event. Given this plan for EP eval in AM to interrogate PPM  In CDU _____________. 85-year-old male PMH hypertension hyperlipidemia hypothyroidism left bundle branch block status post pacemaker presents to ED w/multiple medical concerns. Reprots has been having room spinning dizziness intermittently the last few days, episodes in total.  Additionally has felt weak for the last 3 days. Reports has "lost his hearing" every since seen at [facility] in february and found to have traumatic subdural hematoma. Reports feels overall weak and off-balance when dizziness occurs and is afraid of falling. No current dizziness at this time. Denies chest pain, shortness of breath, fever/chills, n/v/d, urinary sxs.   In ED: Labs notable for CINDY with creatinine 1.72.  Troponin 18 and 19 on repeat.  CT head performed which did not show any acute pathology.  Trace chronic subdural hematoma again noted but not significantly changed from previous.  Patient sent to CDU for gentle IV fluid hydration and repeat labs to assess improvement of CINDY and PT eval.  Prior to coming to CDU pt had 4 beats wide complex tachycardia, resolved, sleeping at time of event. Given this plan for EP eval in AM to interrogate PPM  In CDU patient HD stable, remains intermittently dizzy, also now noted to have UTI. patient seen by case management offered INDRA given lives alone and feels unsteady on feet. will need to admit. seen with Dr. Velez

## 2025-06-25 NOTE — ED CDU PROVIDER DISPOSITION NOTE - ATTENDING APP SHARED VISIT CONTRIBUTION OF CARE
Camilo Velez MD, Attending Physician:     Summary: 85-year-old male who presents with dizziness, generalized weakness, feeling off balance.    In the ED, labs were obtained patient with anemia and elevated creatinine of 1.72, troponin 18, 19, urinalysis concerning for infection, patient treated with antibiotics, CT head showed no acute ICH, but showed trace chronic SDH did not significantly change from previous.    Patient was placed in the CDU for further monitoring, frequent reassessments, Q4 hour vital signs, IV fluids, repeat labs, PT evaluation.    Patient was evaluated by me in the morning, and reports persistent symptoms of dizziness.  On exam, patient with elevated blood pressure, head NCAT, eyes PERRLA, NEURO: AAOx3, Cranial nerves III-XII intact. Strength intact with 5/5 strength in all 4 extremities. Sensation intact to light touch in all 4 extremities. No pronator drift. Normal speech    PT and social work/ saw patient.    The patient will need to be admitted to the hospital for continued evaluation and management.  Discussed with the accepting physician regarding the initial presentation, diagnostic studies, treatments given in the ED, and current plan of care.  The patient was accepted by and endorsed to the medicine team.

## 2025-06-25 NOTE — PATIENT PROFILE ADULT - FALL HARM RISK - HARM RISK INTERVENTIONS
Assistance with ambulation/Assistance OOB with selected safe patient handling equipment/Communicate Risk of Fall with Harm to all staff/Discuss with provider need for PT consult/Monitor gait and stability/Provide patient with walking aids - walker, cane, crutches/Reinforce activity limits and safety measures with patient and family/Sit up slowly, dangle for a short time, stand at bedside before walking/Tailored Fall Risk Interventions/Visual Cue: Yellow wristband and red socks/Bed in lowest position, wheels locked, appropriate side rails in place/Call bell, personal items and telephone in reach/Instruct patient to call for assistance before getting out of bed or chair/Non-slip footwear when patient is out of bed/[place] to call system/Physically safe environment - no spills, clutter or unnecessary equipment/Purposeful Proactive Rounding/Room/bathroom lighting operational, light cord in reach

## 2025-06-25 NOTE — PHYSICAL THERAPY INITIAL EVALUATION ADULT - PERTINENT HX OF CURRENT PROBLEM, REHAB EVAL
85-year-old male PMH hypertension hyperlipidemia hypothyroidism left bundle branch block status post pacemaker.  Presenting with multiple medical concerns. Patient states he has been having vertiginous symptoms room spinning for the last day patient has had 3 episodes in total.  Patient states he has felt weak for about 3 days.  Patient states all the symptoms predate his last admission in [facility] which patient had a subdural hematoma.  Patient states he lost his hearing after a traumatic accident which involved a head injury.  Patient denies chest pain shortness of breath palpitations fevers chills nausea vomiting diarrhea dysuria. Low suspicion for syncope; HR, BP normal.  ECG shows normal atrial-paced rhythm.

## 2025-06-25 NOTE — PHYSICAL THERAPY INITIAL EVALUATION ADULT - ADDITIONAL COMMENTS
Pt. lives alone in apt.  No steps to get in. No HHA services. Patient ambulated without AD independent. pt owns RW at home.

## 2025-06-25 NOTE — PROCEDURE NOTE - ADDITIONAL PROCEDURE DETAILS
Indication for interrogation: Dizziness    Presenting rhythm: SR/BiV pacing 90s    Measured data WNL, normal pacemaker function, NOT dependent    Stored data revealed episodes of brief NSVT and brief pAT, last episode 5/5/25. No episodes to correlate with symptoms.    AP: 0.2%, BiVP: 97.3%

## 2025-06-25 NOTE — ED ADULT NURSE REASSESSMENT NOTE - NSFALLHARMRISKINTERV_ED_ALL_ED

## 2025-06-25 NOTE — ED CDU PROVIDER INITIAL DAY NOTE - CLINICAL SUMMARY MEDICAL DECISION MAKING FREE TEXT BOX
Medical Decision Making / Differential Diagnosis:  HPI concerning for BPPV, versus recurrent subdural hemorrhage.  In the ED:  Basic labs, urinalysis, 25 mg meclizine, reassess.  Screening ECG.  Labs showed CINDY.  Will consider CDU for hydration, reassessment.     Low suspicion for syncope; HR, BP normal.  ECG shows normal atrial-paced rhythm.      ECG directly visualized by me and shows atrial sensed rhythm, rate 67, , , QTc 467.  No ST elevations or depressions.    Plan:  CDU for hydration, clinical reassessment.

## 2025-06-25 NOTE — ED ADULT NURSE REASSESSMENT NOTE - NS ED NURSE REASSESS COMMENT FT1
18.27 Pt had 4 beats of V  TACH  . EKG strip Sent to Covering NP  Charlette Stevenson  via teams
Pt received from FRANKO Webster. Pt oriented to CDU & plan of care was discussed. Pt A&O x 3. Hard of hearing. Independent at baseline. Bedrest due to dizziness at this time. Pt in CDU for telemetry, IV hydration and Meclizine. Pt denies any chest pain, SOB, or palpitations at this time. Patient does endorse dizziness when standing. V/S stable, pt afebrile, left metacarpal 20g  IV in place, patent and free of signs of infiltration. Pt resting in bed. Safety & comfort measures maintained. Call bell in reach. Care continues.
Report received from FRANKO Moore. Pt is well-appearing at this time and is in no acute distress. Pt presents at baseline mental status, AAOx4 . Plan of care and monitoring discussed with pt. Bed locked and lowered for safety. Safety and comfort maintained. VS as documented. Pt remains on CM, Paced Rhythm- denies current chest pain, chest palpitations, SOB, lightheadedness/dizziness.
07.00 Received the Pt from  FRANKO Perez Pt is Observed for Dizziness /CINDY for PT kam Received the Pt A&OX 4  Pt obeys commands Pt is hard of hearing Maryjo N/V/D fever chills cp SOB   Comfort care & safety measures continued  IV site looks clean & dry no signs of infiltration noted pt denies  pain IV site .Pt is oriented to the unit Plan of care explained .  Pt is advised to call for help  call bell with in the reach pt verbalized the understanding .  pending CDU  MD humphrey . GCS 15/15 A&OX 4 PERRLA  size 3 Pt needs help with ADL   No facial droop  No Hand Leg drop denies numbness tingling  Plan of care ongoing

## 2025-06-25 NOTE — H&P ADULT - PROBLEM SELECTOR PLAN 1
appears to be vertigo type  likely UTI and dehydration / CINDY contributing as well  overly improved  treat underlying conditions  meclizine PRN  nutrition / hydration  supportive care

## 2025-06-25 NOTE — ED CDU PROVIDER INITIAL DAY NOTE - PROGRESS NOTE DETAILS
Prior to patient coming to CDU alerted by LEYDI Andre that patient had 4 beats of wide-complex tachycardia on their telemetry monitor.  Patient has known pacemaker and was sleeping at time per PA.  Given this we will continue to keep on cardiac monitor in CDU and consult EP in the morning for PPM interrogation.  - Hasmukh Spaulding PA-C patient reports he is uncomfortable going home, still very dizzy upon standing. appears patient also with a UTI. Case Management involved, offered [facility] patient amenable to this plan for the time being. plan for admission. EP consulted for pacer interrogation

## 2025-06-25 NOTE — H&P ADULT - ASSESSMENT
Patient is an 84yo Male with past medical history of hypertension hyperlipidemia hypothyroidism left bundle branch block status post pacemaker presented originally to ED w/multiple medical concerns including room spinning dizziness intermittently the last few days, weakness for the last 3 days.   Reported has "lost his hearing" every since seen at [facility] in february and found to have traumatic subdural hematoma. Reports feels overall weak and off-balance when dizziness occurs and is afraid of falling. No current dizziness at this time.   	In ED: Labs notable for CINDY with creatinine 1.72.  Troponin 18 and 19 on repeat.  CT head performed which did not show any acute pathology.  Trace chronic subdural hematoma again noted but not significantly changed from previous.    Patient sent to CDU for gentle IV fluid hydration and repeat labs to assess improvement of CINDY and PT eval.   CINDY resolved, dizziness resolved for the most part..   patient found to have UTI, started on antibiotics and admitted for possible [facility] placement if agreeable   Next of kin / emergency number listed is "out of service"

## 2025-06-25 NOTE — H&P ADULT - HISTORY OF PRESENT ILLNESS
>>>>>>Medical Attending Initial / Admission note<<<<<<  -------------------------------------------------------------------------------  CHIEF COMPLAINT & HISTORY OF PRESENT ILLNESS:      Patient is an 84yo Male with past medical history of hypertension hyperlipidemia hypothyroidism left bundle branch block status post pacemaker presents to ED w/multiple medical concerns  	Has been having room spinning dizziness intermittently the last few days, episodes in total.  Additionally has felt weak for the last 3 days. Reports has "lost his hearing" every since seen at [facility] in february and found to have traumatic subdural hematoma. Reports feels overall weak and off-balance when dizziness occurs and is afraid of falling. No current dizziness at this time. Denies chest pain, shortness of breath, fever/chills, n/v/d, urinary sxs.     	In ED: Labs notable for CINDY with creatinine 1.72.  Troponin 18 and 19 on repeat.  CT head performed which did not show any acute pathology.  Trace chronic subdural hematoma again noted but not significantly changed from previous.  Patient sent to CDU for gentle IV fluid hydration and repeat labs to assess improvement of CINDY and PT eval. Case d/w ED attending.    --------------------------------------------------------------------------------  PAST MEDICAL / SURGICAL  HISTORY:    PAST MEDICAL & SURGICAL HISTORY:  Hypothyroidism      Bradycardia      Chronic systolic congestive heart failure      HTN (hypertension)      No significant past surgical history          --------------------------------------------------------------------------------  FAMILY HISTORY:    FAMILY HISTORY:  FH: heart disease (Sibling)      --------------------------------------------------------------------------------  SOCIAL HISTORY:  Alcohol: None reported  Smoking: None reported     --------------------------------------------------------------------------------  ALLERGIES:    [As bellow, reviewed]    --------------------------------------------------------------------------------  MEDICATIONS:   [As ann, reviewed]    --------------------------------------------------------------------------------  REVIEW OF SYSTEM:    GEN: no fever, no chills, no pain  RESP: no SOB, no cough, no sputum  CVS: no chest pain, no palpitations, no edema  GI: no abdominal pain, no nausea, no vomiting, no constipation, no diarrhea  : no dysuria, no frequency, no hematuria  Neuro: no headache, no dizziness  PSYCH: no anxiety, no depression  Derm : no itching, no rash    --------------------------------------------------------------------------------  VITAL SIGNS:  Height (cm): 152.4  Weight (kg): 37.2  BMI (kg/m2): 16  Vital Signs Last 24 HrsT(C): 36.8 (06-25-25 @ 11:18)  T(F): 98.2 (06-25-25 @ 11:18), Max: 98.2 (06-25-25 @ 11:18)  HR: 63 (06-25-25 @ 11:18) (62 - 94)  BP: 163/75 (06-25-25 @ 11:18)  RR: 18 (06-25-25 @ 11:18) (18 - 20)  SpO2: 97% (06-25-25 @ 11:18) (97% - 99%)      CAPILLARY BLOOD GLUCOSE          --------------------------------------------------------------------------------  PHYSICAL EXAM:    GEN: A&O X 3 , NAD , comfortable, pleasant, calm  HEENT: NCAT, PERRL, MMM, hearing intact  Neck: supple , no JVD  CVS: S1S2 , regular , No M/R/G appreciated  PULM: CTA B/L,  no W/R/R appreciated  ABD.: soft. non tender, non distended,  bowel sounds present  Extrem: intact pulses , no edema   Derm: No rash , no ecchymoses  PSYCH : normal mood,  no delusion not anxious    --------------------------------------------------------------------------------  LAB AND IMAGING:   [As ann, tiffany]    --------------------------------------------------------------------------------  ASSESSMENT AND PLAN:   [As bellow]    --------------------  Case discussed with   ___________________________  H. CRISTIN Wharton.  Pager: [phone]  06-25-25     ( Note written / Date of service 06-25-25 ( This is certified to be the same as "ENTERED" date above ( for billing Purposes )))   >>>>>>Medical Attending Initial / Admission note<<<<<<  -------------------------------------------------------------------------------  CHIEF COMPLAINT & HISTORY OF PRESENT ILLNESS:      Patient is an 86yo Male with past medical history of hypertension hyperlipidemia hypothyroidism left bundle branch block status post pacemaker presented originally to ED w/multiple medical concerns including room spinning dizziness intermittently the last few days, weakness for the last 3 days.   Reported has "lost his hearing" every since seen at [facility] in february and found to have traumatic subdural hematoma. Reports feels overall weak and off-balance when dizziness occurs and is afraid of falling. No current dizziness at this time.   	In ED: Labs notable for CINDY with creatinine 1.72.  Troponin 18 and 19 on repeat.  CT head performed which did not show any acute pathology.  Trace chronic subdural hematoma again noted but not significantly changed from previous.    Patient sent to CDU for gentle IV fluid hydration and repeat labs to assess improvement of CINDY and PT eval.   CINDY resolved, dizziness resolved for the most part..   patient found to have UTI, started on antibiotics and admitted for possible [facility] placement if agreeable   Next of kin / emergency number listed is "out of service"    --------------------------------------------------------------------------------  PAST MEDICAL / SURGICAL  HISTORY:    Hypothyroidism  Bradycardia post PPM  Chronic systolic congestive heart failure  HTN (hypertension)    No significant past surgical history reproted    --------------------------------------------------------------------------------  FAMILY HISTORY:    FH: heart disease (Sibling)    --------------------------------------------------------------------------------  SOCIAL HISTORY:  Alcohol: None reported  Smoking: None reported     --------------------------------------------------------------------------------  ALLERGIES:    [As ann, reviewed]    --------------------------------------------------------------------------------  MEDICATIONS:   [As ann, reviewed]    --------------------------------------------------------------------------------  REVIEW OF SYSTEM:    GEN: no fever, no chills, no pain  RESP: no SOB, no cough, no sputum  CVS: no chest pain, no palpitations, no edema  GI: no abdominal pain, no nausea, no vomiting, no constipation, no diarrhea  : no dysuria, no frequency, no hematuria  Neuro: no headache, no dizziness  PSYCH: no anxiety, no depression  Derm : no itching, no rash    --------------------------------------------------------------------------------  VITAL SIGNS:  Height (cm): 152.4  Weight (kg): 37.2  BMI (kg/m2): 16  Vital Signs Last 24 HrsT(C): 36.8 (06-25-25 @ 11:18)  T(F): 98.2 (06-25-25 @ 11:18), Max: 98.2 (06-25-25 @ 11:18)  HR: 63 (06-25-25 @ 11:18) (62 - 94)  BP: 163/75 (06-25-25 @ 11:18)  RR: 18 (06-25-25 @ 11:18) (18 - 20)  SpO2: 97% (06-25-25 @ 11:18) (97% - 99%)      --------------------------------------------------------------------------------  PHYSICAL EXAM:    GEN: A&O X 2 , NAD , comfortable, pleasant, calm, thin elderly man  HEENT: NCAT, PERRL, MMM, very [place]   Neck: supple , no JVD  CVS: S1S2 , regular , No M/R/G appreciated  PULM: CTA B/L,  no W/R/R appreciated  ABD.: soft. non tender, non distended,  bowel sounds present  Extrem: intact pulses , no edema   Derm: No rash , no ecchymoses  PSYCH : normal mood,  no delusion not anxious    --------------------------------------------------------------------------------  LAB AND IMAGING:   [As ann, tiffany]    --------------------------------------------------------------------------------  ASSESSMENT AND PLAN:   [As bellow]    --------------------  Case discussed with patient   ___________________________  H. CRISTIN Wharton.  Pager: [phone]  06-25-25     ( Note written / Date of service 06-25-25 ( This is certified to be the same as "ENTERED" date above ( for billing Purposes )))

## 2025-06-25 NOTE — ED CDU PROVIDER DISPOSITION NOTE - NSFOLLOWUPINSTRUCTIONS_ED_ALL_ED_FT
Follow-up with your primary care doctor in the next 2-3 days for reevaluation.    Additionally please follow-up with your cardiologist within the next 1 week for re-evaluation.    Rest, stay hydrated.  Continue current home medications as previously prescribed.    Return to the Emergency Department immediately if you develop any new/worsening symptoms including but not limited to worsening dizziness, chest pain, shortness of breath, weakness, inability to eat/drink or any other concerns.

## 2025-06-25 NOTE — H&P ADULT - PROBLEM SELECTOR PLAN 7
PT noted   follow up for safe DC planing / likely [facility]  also need to find correct contact info for [facility]

## 2025-06-26 LAB
ALBUMIN SERPL ELPH-MCNC: 3.2 G/DL — LOW (ref 3.3–5)
ALP SERPL-CCNC: 82 U/L — SIGNIFICANT CHANGE UP (ref 40–120)
ALT FLD-CCNC: 5 U/L — LOW (ref 10–45)
ANION GAP SERPL CALC-SCNC: 12 MMOL/L — SIGNIFICANT CHANGE UP (ref 5–17)
ANION GAP SERPL CALC-SCNC: 8 MMOL/L — SIGNIFICANT CHANGE UP (ref 5–17)
AST SERPL-CCNC: 16 U/L — SIGNIFICANT CHANGE UP (ref 10–40)
BILIRUB SERPL-MCNC: 0.1 MG/DL — LOW (ref 0.2–1.2)
BLD GP AB SCN SERPL QL: NEGATIVE — SIGNIFICANT CHANGE UP
BUN SERPL-MCNC: 17 MG/DL — SIGNIFICANT CHANGE UP (ref 7–23)
BUN SERPL-MCNC: 19 MG/DL — SIGNIFICANT CHANGE UP (ref 7–23)
CALCIUM SERPL-MCNC: 8.5 MG/DL — SIGNIFICANT CHANGE UP (ref 8.4–10.5)
CALCIUM SERPL-MCNC: 8.5 MG/DL — SIGNIFICANT CHANGE UP (ref 8.4–10.5)
CHLORIDE SERPL-SCNC: 105 MMOL/L — SIGNIFICANT CHANGE UP (ref 96–108)
CHLORIDE SERPL-SCNC: 106 MMOL/L — SIGNIFICANT CHANGE UP (ref 96–108)
CHOLEST SERPL-MCNC: 235 MG/DL — HIGH
CO2 SERPL-SCNC: 19 MMOL/L — LOW (ref 22–31)
CO2 SERPL-SCNC: 23 MMOL/L — SIGNIFICANT CHANGE UP (ref 22–31)
CREAT SERPL-MCNC: 1.13 MG/DL — SIGNIFICANT CHANGE UP (ref 0.5–1.3)
CREAT SERPL-MCNC: 1.21 MG/DL — SIGNIFICANT CHANGE UP (ref 0.5–1.3)
EGFR: 58 ML/MIN/1.73M2 — LOW
EGFR: 58 ML/MIN/1.73M2 — LOW
EGFR: 63 ML/MIN/1.73M2 — SIGNIFICANT CHANGE UP
EGFR: 63 ML/MIN/1.73M2 — SIGNIFICANT CHANGE UP
FERRITIN SERPL-MCNC: 15 NG/ML — LOW (ref 30–400)
FOLATE SERPL-MCNC: 9.6 NG/ML — SIGNIFICANT CHANGE UP
GLUCOSE SERPL-MCNC: 100 MG/DL — HIGH (ref 70–99)
GLUCOSE SERPL-MCNC: 95 MG/DL — SIGNIFICANT CHANGE UP (ref 70–99)
HCT VFR BLD CALC: 27.6 % — LOW (ref 39–50)
HCT VFR BLD CALC: 29.2 % — LOW (ref 39–50)
HDLC SERPL-MCNC: 33 MG/DL — LOW
HGB BLD-MCNC: 8.8 G/DL — LOW (ref 13–17)
HGB BLD-MCNC: 9 G/DL — LOW (ref 13–17)
IRON SATN MFR SERPL: 11 % — LOW (ref 16–55)
IRON SATN MFR SERPL: 37 UG/DL — LOW (ref 45–165)
LDLC SERPL-MCNC: 166 MG/DL — HIGH
LIPID PNL WITH DIRECT LDL SERPL: 166 MG/DL — HIGH
MAGNESIUM SERPL-MCNC: 1.8 MG/DL — SIGNIFICANT CHANGE UP (ref 1.6–2.6)
MCHC RBC-ENTMCNC: 28.5 PG — SIGNIFICANT CHANGE UP (ref 27–34)
MCHC RBC-ENTMCNC: 29.1 PG — SIGNIFICANT CHANGE UP (ref 27–34)
MCHC RBC-ENTMCNC: 30.8 G/DL — LOW (ref 32–36)
MCHC RBC-ENTMCNC: 31.9 G/DL — LOW (ref 32–36)
MCV RBC AUTO: 91.4 FL — SIGNIFICANT CHANGE UP (ref 80–100)
MCV RBC AUTO: 92.4 FL — SIGNIFICANT CHANGE UP (ref 80–100)
NONHDLC SERPL-MCNC: 202 MG/DL — HIGH
NRBC # BLD AUTO: 0 K/UL — SIGNIFICANT CHANGE UP (ref 0–0)
NRBC # BLD AUTO: 0 K/UL — SIGNIFICANT CHANGE UP (ref 0–0)
NRBC # FLD: 0 K/UL — SIGNIFICANT CHANGE UP (ref 0–0)
NRBC # FLD: 0 K/UL — SIGNIFICANT CHANGE UP (ref 0–0)
NRBC BLD AUTO-RTO: 0 /100 WBCS — SIGNIFICANT CHANGE UP (ref 0–0)
NRBC BLD AUTO-RTO: 0 /100 WBCS — SIGNIFICANT CHANGE UP (ref 0–0)
PHOSPHATE SERPL-MCNC: 2.8 MG/DL — SIGNIFICANT CHANGE UP (ref 2.5–4.5)
PLATELET # BLD AUTO: 170 K/UL — SIGNIFICANT CHANGE UP (ref 150–400)
PLATELET # BLD AUTO: 173 K/UL — SIGNIFICANT CHANGE UP (ref 150–400)
PMV BLD: 9.3 FL — SIGNIFICANT CHANGE UP (ref 7–13)
PMV BLD: 9.4 FL — SIGNIFICANT CHANGE UP (ref 7–13)
POTASSIUM SERPL-MCNC: 4.4 MMOL/L — SIGNIFICANT CHANGE UP (ref 3.5–5.3)
POTASSIUM SERPL-MCNC: 4.9 MMOL/L — SIGNIFICANT CHANGE UP (ref 3.5–5.3)
POTASSIUM SERPL-SCNC: 4.4 MMOL/L — SIGNIFICANT CHANGE UP (ref 3.5–5.3)
POTASSIUM SERPL-SCNC: 4.9 MMOL/L — SIGNIFICANT CHANGE UP (ref 3.5–5.3)
PROT SERPL-MCNC: 6 G/DL — SIGNIFICANT CHANGE UP (ref 6–8.3)
RBC # BLD: 3.02 M/UL — LOW (ref 4.2–5.8)
RBC # BLD: 3.16 M/UL — LOW (ref 4.2–5.8)
RBC # FLD: 15.6 % — HIGH (ref 10.3–14.5)
RBC # FLD: 15.7 % — HIGH (ref 10.3–14.5)
RH IG SCN BLD-IMP: POSITIVE — SIGNIFICANT CHANGE UP
SODIUM SERPL-SCNC: 136 MMOL/L — SIGNIFICANT CHANGE UP (ref 135–145)
SODIUM SERPL-SCNC: 137 MMOL/L — SIGNIFICANT CHANGE UP (ref 135–145)
TIBC SERPL-MCNC: 344 UG/DL — SIGNIFICANT CHANGE UP (ref 220–430)
TRIGL SERPL-MCNC: 194 MG/DL — HIGH
UIBC SERPL-MCNC: 307 UG/DL — SIGNIFICANT CHANGE UP (ref 110–370)
VIT B12 SERPL-MCNC: 314 PG/ML — SIGNIFICANT CHANGE UP (ref 232–1245)
WBC # BLD: 5.94 K/UL — SIGNIFICANT CHANGE UP (ref 3.8–10.5)
WBC # BLD: 7.33 K/UL — SIGNIFICANT CHANGE UP (ref 3.8–10.5)
WBC # FLD AUTO: 5.94 K/UL — SIGNIFICANT CHANGE UP (ref 3.8–10.5)
WBC # FLD AUTO: 7.33 K/UL — SIGNIFICANT CHANGE UP (ref 3.8–10.5)

## 2025-06-26 PROCEDURE — 80061 LIPID PANEL: CPT

## 2025-06-26 PROCEDURE — 81001 URINALYSIS AUTO W/SCOPE: CPT

## 2025-06-26 PROCEDURE — 86850 RBC ANTIBODY SCREEN: CPT

## 2025-06-26 PROCEDURE — 84484 ASSAY OF TROPONIN QUANT: CPT

## 2025-06-26 PROCEDURE — 85025 COMPLETE CBC W/AUTO DIFF WBC: CPT

## 2025-06-26 PROCEDURE — 83735 ASSAY OF MAGNESIUM: CPT

## 2025-06-26 PROCEDURE — 82728 ASSAY OF FERRITIN: CPT

## 2025-06-26 PROCEDURE — 84100 ASSAY OF PHOSPHORUS: CPT

## 2025-06-26 PROCEDURE — 85027 COMPLETE CBC AUTOMATED: CPT

## 2025-06-26 PROCEDURE — 83550 IRON BINDING TEST: CPT

## 2025-06-26 PROCEDURE — 97161 PT EVAL LOW COMPLEX 20 MIN: CPT

## 2025-06-26 PROCEDURE — 82746 ASSAY OF FOLIC ACID SERUM: CPT

## 2025-06-26 PROCEDURE — 70450 CT HEAD/BRAIN W/O DYE: CPT

## 2025-06-26 PROCEDURE — 86901 BLOOD TYPING SEROLOGIC RH(D): CPT

## 2025-06-26 PROCEDURE — 93005 ELECTROCARDIOGRAM TRACING: CPT

## 2025-06-26 PROCEDURE — 82607 VITAMIN B-12: CPT

## 2025-06-26 PROCEDURE — 83540 ASSAY OF IRON: CPT

## 2025-06-26 PROCEDURE — 80053 COMPREHEN METABOLIC PANEL: CPT

## 2025-06-26 PROCEDURE — 80048 BASIC METABOLIC PNL TOTAL CA: CPT

## 2025-06-26 PROCEDURE — 93010 ELECTROCARDIOGRAM REPORT: CPT

## 2025-06-26 PROCEDURE — 87086 URINE CULTURE/COLONY COUNT: CPT

## 2025-06-26 PROCEDURE — 86900 BLOOD TYPING SEROLOGIC ABO: CPT

## 2025-06-26 PROCEDURE — 86780 TREPONEMA PALLIDUM: CPT

## 2025-06-26 RX ORDER — ROSUVASTATIN CALCIUM 5 MG/1
5 TABLET, FILM COATED ORAL AT BEDTIME
Refills: 0 | Status: DISCONTINUED | OUTPATIENT
Start: 2025-06-26 | End: 2025-06-29

## 2025-06-26 RX ORDER — FERROUS SULFATE 137(45) MG
325 TABLET, EXTENDED RELEASE ORAL DAILY
Refills: 0 | Status: DISCONTINUED | OUTPATIENT
Start: 2025-06-26 | End: 2025-06-29

## 2025-06-26 RX ADMIN — Medication 40 MILLIGRAM(S): at 05:14

## 2025-06-26 RX ADMIN — CEFTRIAXONE 100 MILLIGRAM(S): 500 INJECTION, POWDER, FOR SOLUTION INTRAMUSCULAR; INTRAVENOUS at 12:36

## 2025-06-26 RX ADMIN — CARVEDILOL 12.5 MILLIGRAM(S): 3.12 TABLET, FILM COATED ORAL at 05:14

## 2025-06-26 RX ADMIN — Medication 25 MILLIGRAM(S): at 12:36

## 2025-06-26 RX ADMIN — ROSUVASTATIN CALCIUM 5 MILLIGRAM(S): 5 TABLET, FILM COATED ORAL at 21:22

## 2025-06-26 RX ADMIN — AMLODIPINE BESYLATE 2.5 MILLIGRAM(S): 10 TABLET ORAL at 05:14

## 2025-06-26 RX ADMIN — CARVEDILOL 12.5 MILLIGRAM(S): 3.12 TABLET, FILM COATED ORAL at 18:05

## 2025-06-26 NOTE — PROGRESS NOTE ADULT - ASSESSMENT
_________________________________________________________________________________________  ========>>  M E D I C A L   A T T E N D I N G    F O L L O W  U P  N O T E  <<=========  -----------------------------------------------------------------------------------------------------    - Patient seen and examined by me earlier today.   - In summary,  PARDEEP MARAVILLA is a 85y year old man admitted with UTi, dizziness, gait instability..   - Patient today overall doing ok, comfortable, eating OK.   patient had some dizziness in the morning, resolved, no other c.o     >> patient states he has a ticket back to [email or web address] next Wednesday and will definately NOT be going to rehabilitation..       he wants rehabilitation here as much as possible and go home in the next couple of days..  and will take all responsibility..     ==================>> REVIEW OF SYSTEM <<=================    GEN: no fever, no chills, no pain  RESP: no SOB, no cough, no sputum  CVS: no chest pain, no palpitations  GI: no abdominal pain, no nausea  : no dysuria, no frequency  Neuro: no headache, no dizziness now, as above     ==================>> PHYSICAL EXAM <<=================    GEN: A&O X 3 , NAD , comfortable, pleasant, calm   HEENT: NCAT, PERRL, MMM, very hard of hearing    CVS: S1S2 , regular , No M/R/G appreciated  PULM: CTA B/L,  no W/R/R appreciated  ABD.: soft. non tender, non distended,  bowel sounds present  Extrem: intact pulses , no edema            ( Note written / Date of service 06-26-25 ( This is certified to be the same as "ENTERED" date above ( for billing purposes)))    ==================>> MEDICATIONS <<====================    MEDICATIONS  (STANDING):  amLODIPine   Tablet 2.5 milliGRAM(s) Oral daily  carvedilol 12.5 milliGRAM(s) Oral every 12 hours  cefTRIAXone   IVPB 1000 milliGRAM(s) IV Intermittent every 24 hours  ferrous    sulfate 325 milliGRAM(s) Oral daily  meclizine 25 milliGRAM(s) Oral daily  pantoprazole    Tablet 40 milliGRAM(s) Oral before breakfast  rosuvastatin 5 milliGRAM(s) Oral at bedtime    MEDICATIONS  (PRN):  acetaminophen     Tablet .. 650 milliGRAM(s) Oral every 6 hours PRN Temp greater or equal to 38C (100.4F), Mild Pain (1 - 3)  aluminum hydroxide/magnesium hydroxide/simethicone Suspension 30 milliLiter(s) Oral every 4 hours PRN Dyspepsia  melatonin 3 milliGRAM(s) Oral at bedtime PRN Insomnia  ondansetron Injectable 4 milliGRAM(s) IV Push every 8 hours PRN Nausea and/or Vomiting    ___________  Active diet:  Diet, DASH/TLC:   Sodium & Cholesterol Restricted  Vegan Accepts Vegetable Products Only  ___________________    ==================>> VITAL SIGNS <<==================    T(C): 36.3 (06-26-25 @ 10:55), Max: 36.9 (06-25-25 @ 19:27)  HR: 65 (06-26-25 @ 10:55) (65 - 93)  BP: 124/73 (06-26-25 @ 10:55) (114/70 - 163/78)  BP(mean): 88 (06-25-25 @ 19:27)  RR: 18 (06-26-25 @ 10:55) (18 - 19)  SpO2: 95% (06-26-25 @ 10:55) (94% - 99%)     I&O's Summary    26 Jun 2025 07:01  -  26 Jun 2025 13:56  --------------------------------------------------------  IN: 240 mL / OUT: 0 mL / NET: 240 mL     ==================>> LAB AND IMAGING <<==================                        9.0    5.94  )-----------( 173      ( 26 Jun 2025 07:00 )             29.2        Hemoglobin:   9.0 <<==,  8.8 <<==,  10.0 <<==    Iron with Total Binding Capacity in AM (06.26.25 @ 07:00)    Iron - Total Binding Capacity.: 344 ug/dL   % Saturation, Iron: 11 %   Iron Total: 37 ug/dL   Unsaturated Iron Binding Capacity: 307 ug/dL         06-26-25 @ 07:02  Vit B12:  314 pg/mL  Folate:   9.6 ng/mL    06-26    136  |  105  |  17  ----------------------------<  95  4.4   |  19[L]  |  1.13    Ca    8.5      26 Jun 2025 07:00  Phos  2.8     06-26  Mg     1.8     06-26    TPro  6.0  /  Alb  3.2[L]  /  TBili  0.1[L]  /  DBili  x   /  AST  16  /  ALT  5[L]  /  AlkPhos  82  06-26    Urinalysis:  06-25-25 @ 08:51  Leuk. Est.: Small  Nirite: Positive  WBC: 21  Blood: Negative     salt Crystal: --     calcium crystal: --     Bili: Negative     cast: 6  color: Yellow  Bacteria: Too Numerous to count  Epith. cell: 0  Yeast: --     Ketone: --     Protein: Trace     Glucose: Negative     sperm: --     Spec.Gravity: 1.018    Lipid profile:  (06-26-25)     Total: 235     LDL  : (p)     HDL  :33     TG   :194     ___________________________________________________________________________________  ===============>>  A S S E S S M E N T   A N D   P L A N <<===============  ------------------------------------------------------------------------------------------    Patient is an 86yo Male with past medical history of hypertension hyperlipidemia hypothyroidism left bundle branch block status post pacemaker presented originally to ED w/multiple medical concerns including room spinning dizziness intermittently the last few days, weakness for the last 3 days.   Reported has "lost his hearing" every since seen at [facility] in february and found to have traumatic subdural hematoma. Reports feels overall weak and off-balance when dizziness occurs and is afraid of falling. No current dizziness at this time.   	In ED: Labs notable for CINDY with creatinine 1.72.  Troponin 18 and 19 on repeat.  CT head performed which did not show any acute pathology.  Trace chronic subdural hematoma again noted but not significantly changed from previous.    Patient sent to CDU for gentle IV fluid hydration and repeat labs to assess improvement of CINDY and PT eval.   CINDY resolved, dizziness resolved for the most part..   patient found to have UTI, started on antibiotics and admitted for possible [facility] placement if agreeable   Next of kin / emergency number listed is "out of service"         Problem/Plan - 1:  ·  Problem: Dizziness.   ·  Plan: appears to be vertigo type  likely UTI and dehydration / CINDY contributing as well  overly improved  treat underlying conditions  meclizine PRN  nutrition / hydration  supportive care.  ENT follow up as outpatient ( see bellow )     Problem/Plan - 2:  ·  Problem: Acute UTI.   ·  Plan: continue rocephin  follow cultures   PO hydration.     Problem/Plan - 3:  ·  Problem: Anemia.   Iron deficiency   >> iron supplements ordered    monitor.  outpatient GI follow up      Problem/Plan - 4:  ·  Problem: CINDY (acute kidney injury).   ·  Plan: resolved post hydration  encourage PO intake.     Problem/Plan - 5:  ·  Problem: HTN (hypertension).   ·  Plan: Continue home medications and monitor.  adjust as needed.     Problem/Plan - 6:  ·  Problem: Hearing loss.   patient states has seen ENT and was given Rx for hearing aid but has not gotten them yet ..   to follow up as outpatient      Problem/Plan - 7:  ·  Problem: Weakness.   ·  Plan: PT follow up   patient not willing to go to [facility]  discussed with RN to assist patient OOB denice hair and ambulate  daily ..      Problem/Plan - 8:  ·  Problem: dyslipidemia    started on Crestor  outpatient follow up    -GI/DVT Prophylaxis per protocol.    --------------------------------------------  Case discussed with patient, RN, Nurse Practitioner   Education given on findings and plan of care  ___________________________  H. CRISTIN Wharton.  Pager: [phone]

## 2025-06-26 NOTE — PHARMACOTHERAPY INTERVENTION NOTE - COMMENTS
Patient is an 84yo Male with past medical history of hypertension hyperlipidemia hypothyroidism left bundle branch block status post pacemaker presented originally to ED w/multiple medical concerns including room spinning dizziness intermittently the last few days, weakness for the last 3 days.     --------------------------------------------------------------------------------  REVIEW OF SYSTEM (Dated 6/25/25):    GEN: no fever, no chills, no pain  RESP: no SOB, no cough, no sputum  CVS: no chest pain, no palpitations, no edema  GI: no abdominal pain, no nausea, no vomiting, no constipation, no diarrhea  : no dysuria, no frequency, no hematuria  Neuro: no headache, no dizziness  PSYCH: no anxiety, no depression  Derm : no itching, no rash    --------------------------------------------------------------------------------    Patient is currently AOx4 with positive UA, but asymptomatic with no AMS, recommending discontinue Ceftriaxone 1g every 24 hours for UTI and monitor off antibiotics.     Claude Hillman (Jian Ming) - PharmD, BCPS  Transitions of Care Pharmacist  Available on [employer] Teams (Preferred)

## 2025-06-27 RX ADMIN — ROSUVASTATIN CALCIUM 5 MILLIGRAM(S): 5 TABLET, FILM COATED ORAL at 22:54

## 2025-06-27 RX ADMIN — Medication 25 MILLIGRAM(S): at 12:47

## 2025-06-27 RX ADMIN — CEFTRIAXONE 100 MILLIGRAM(S): 500 INJECTION, POWDER, FOR SOLUTION INTRAMUSCULAR; INTRAVENOUS at 12:47

## 2025-06-27 RX ADMIN — AMLODIPINE BESYLATE 2.5 MILLIGRAM(S): 10 TABLET ORAL at 05:11

## 2025-06-27 RX ADMIN — CARVEDILOL 12.5 MILLIGRAM(S): 3.12 TABLET, FILM COATED ORAL at 05:11

## 2025-06-27 RX ADMIN — CARVEDILOL 12.5 MILLIGRAM(S): 3.12 TABLET, FILM COATED ORAL at 17:46

## 2025-06-27 RX ADMIN — Medication 40 MILLIGRAM(S): at 05:11

## 2025-06-27 RX ADMIN — Medication 325 MILLIGRAM(S): at 11:30

## 2025-06-27 NOTE — PROGRESS NOTE ADULT - ASSESSMENT
_________________________________________________________________________________________  ========>>  M E D I C A L   A T T E N D I N G    F O L L O W  U P  N O T E  <<=========  -----------------------------------------------------------------------------------------------------    - Patient seen and examined by me earlier today.   - In summary,  PARDEEP MARAVILLA is a 85y year old man admitted with UTi, dizziness, gait instability..   - Patient today overall doing ok, comfortable, eating OK.   patient had some dizziness in the morning, resolved, no other c.o     >> patient states he has a ticket back to [place] next Wednesday and will definately NOT be going to rehabilitation..       he wants rehabilitation here as much as possible and go home in the next couple of days..  and will take all responsibility..     ==================>> REVIEW OF SYSTEM <<=================    GEN: no fever, no chills, no pain  RESP: no SOB, no cough, no sputum  CVS: no chest pain, no palpitations  GI: no abdominal pain, no nausea  : no dysuria, no frequency  Neuro: no headache, no dizziness now, as above     ==================>> PHYSICAL EXAM <<=================    GEN: A&O X 3 , NAD , comfortable, pleasant, calm   HEENT: NCAT, PERRL, MMM, very hard of hearing    CVS: S1S2 , regular , No M/R/G appreciated  PULM: CTA B/L,  no W/R/R appreciated  ABD.: soft. non tender, non distended,  bowel sounds present  Extrem: intact pulses , no edema         ( Note written / Date of service 06-27-25 ( This is certified to be the same as "ENTERED" date above ( for billing purposes)))    ==================>> MEDICATIONS <<====================    amLODIPine   Tablet 2.5 milliGRAM(s) Oral daily  carvedilol 12.5 milliGRAM(s) Oral every 12 hours  cefTRIAXone   IVPB 1000 milliGRAM(s) IV Intermittent every 24 hours  ferrous    sulfate 325 milliGRAM(s) Oral daily  meclizine 25 milliGRAM(s) Oral daily  pantoprazole    Tablet 40 milliGRAM(s) Oral before breakfast  rosuvastatin 5 milliGRAM(s) Oral at bedtime    MEDICATIONS  (PRN):  acetaminophen     Tablet .. 650 milliGRAM(s) Oral every 6 hours PRN Temp greater or equal to 38C (100.4F), Mild Pain (1 - 3)  aluminum hydroxide/magnesium hydroxide/simethicone Suspension 30 milliLiter(s) Oral every 4 hours PRN Dyspepsia  melatonin 3 milliGRAM(s) Oral at bedtime PRN Insomnia  ondansetron Injectable 4 milliGRAM(s) IV Push every 8 hours PRN Nausea and/or Vomiting    ___________  Active diet:  Diet, DASH/TLC:   Sodium & Cholesterol Restricted  Vegan Accepts Vegetable Products Only  ___________________    ==================>> VITAL SIGNS <<==================    Vital Signs Last 24 HrsT(C): 36.7 (06-27-25 @ 10:41)  T(F): 98.1 (06-27-25 @ 10:41), Max: 98.1 (06-27-25 @ 10:41)  HR: 70 (06-27-25 @ 10:41) (66 - 77)  BP: 105/69 (06-27-25 @ 10:41)  RR: 18 (06-27-25 @ 10:41) (18 - 18)  SpO2: 96% (06-27-25 @ 10:41) (95% - 96%)      CAPILLARY BLOOD GLUCOSE         ==================>> LAB AND IMAGING <<==================                        9.0    5.94  )-----------( 173      ( 26 Jun 2025 07:00 )             29.2        06-26    136  |  105  |  17  ----------------------------<  95  4.4   |  19[L]  |  1.13    Ca    8.5      26 Jun 2025 07:00  Phos  2.8     06-26  Mg     1.8     06-26    TPro  6.0  /  Alb  3.2[L]  /  TBili  0.1[L]  /  DBili  x   /  AST  16  /  ALT  5[L]  /  AlkPhos  82  06-26    WBC count:   5.94 <<== ,  7.33 <<== ,  5.45 <<==   Hemoglobin:   9.0 <<==,  8.8 <<==,  10.0 <<==  platelets:  173 <==, 170 <==, 217 <==    Creatinine:  1.13  <<==, 1.21  <<==, 1.22  <<==, 1.72  <<==  Sodium:   136  <==, 137  <==, 134  <==, 138  <==       AST:          16(06-26) <== , 19(06-25) <== , 31(06-24) <==      ALT:        5(06-26)  <== , 7(06-25)  <== , 10(06-24)  <==      AP:        82(06-26)  <=, 68(06-25)  <=, 75(06-24)  <=     Bili:        0.1(06-26)  <=, 0.5(06-25)  <=, 0.4(06-24)  <=    ____________________________    M I C R O B I O L O G Y :    Urinalysis with Rflx Culture (collected 25 Jun 2025 08:51)    Culture - Urine (collected 25 Jun 2025 08:51)  Source: Clean Catch  Preliminary Report (27 Jun 2025 00:37):    >100,000 CFU/ml Gram Negative Rods           ___________________________________________________________________________________  ===============>>  A S S E S S M E N T   A N D   P L A N <<===============  ------------------------------------------------------------------------------------------    Patient is an 84yo Male with past medical history of hypertension hyperlipidemia hypothyroidism left bundle branch block status post pacemaker presented originally to ED w/multiple medical concerns including room spinning dizziness intermittently the last few days, weakness for the last 3 days.   Reported has "lost his hearing" every since seen at [facility] in february and found to have traumatic subdural hematoma. Reports feels overall weak and off-balance when dizziness occurs and is afraid of falling. No current dizziness at this time.   	In ED: Labs notable for CINDY with creatinine 1.72.  Troponin 18 and 19 on repeat.  CT head performed which did not show any acute pathology.  Trace chronic subdural hematoma again noted but not significantly changed from previous.    Patient sent to CDU for gentle IV fluid hydration and repeat labs to assess improvement of CINDY and PT eval.   CINDY resolved, dizziness resolved for the most part..   patient found to have UTI, started on antibiotics and admitted for possible INDRA placement if agreeable   Next of kin / emergency number listed is "out of service"         Problem/Plan - 1:  ·  Problem: Dizziness.   ·  Plan: appears to be vertigo type  likely UTI and dehydration / CINDY contributing as well  overly improved  treat underlying conditions  meclizine PRN  nutrition / hydration  supportive care.  ENT follow up as outpatient ( see ann )     Problem/Plan - 2:  ·  Problem: Acute UTI.   ·  Plan: continue rocephin  follow cultures   PO hydration.     Problem/Plan - 3:  ·  Problem: Anemia.   Iron deficiency   >> iron supplements ordered    monitor.  outpatient GI follow up      Problem/Plan - 4:  ·  Problem: CINDY (acute kidney injury).   ·  Plan: resolved post hydration  encourage PO intake.     Problem/Plan - 5:  ·  Problem: HTN (hypertension).   ·  Plan: Continue home medications and monitor.  adjust as needed.     Problem/Plan - 6:  ·  Problem: Hearing loss.   patient states has seen ENT and was given Rx for hearing aid but has not gotten them yet ..   to follow up as outpatient      Problem/Plan - 7:  ·  Problem: Weakness.   ·  Plan: PT follow up   patient not willing to go to [facility]  discussed with RN to assist patient OOB denice hair and ambulate  daily ..      Problem/Plan - 8:  ·  Problem: dyslipidemia    started on Crestor  outpatient follow up    -GI/DVT Prophylaxis per protocol.    --------------------------------------------  Case discussed with patient, RN, Nurse Practitioner   Education given on findings and plan of care  ___________________________  ABDULAZIZ Wharton D.O.  Pager: [phone]     _________________________________________________________________________________________  ========>>  M E D I C A L   A T T E N D I N G    F O L L O W  U P  N O T E  <<=========  -----------------------------------------------------------------------------------------------------    - Patient seen and examined by me earlier today.     - Patient today overall doing ok, comfortable, eating OK.   patient had some dizziness in the morning, resolved, no other c.o     otherwise doingok, per RN patient did well with walking with walker and minimal assist...     >> patient states he has a ticket back to [facility] next Wednesday and will definately NOT be going to rehabilitation..       he wants rehabilitation here as much as possible and go home in the next couple of days..  and will take all responsibility..   >> discussed with RN and  again today : plan remains same.. likely DC Sunday, more activity in house as able      patient will have a friend come and accompany him home and throughout his travels ( he already bought a ticket for him as well )     ==================>> REVIEW OF SYSTEM <<=================    GEN: no fever, no chills, no pain  RESP: no SOB, no cough, no sputum  CVS: no chest pain, no palpitations  GI: no abdominal pain, no nausea  : no dysuria, no frequency  Neuro: no headache, no dizziness now, as above     ==================>> PHYSICAL EXAM <<=================    GEN: A&O X 3 , NAD , comfortable, pleasant, calm   HEENT: NCAT, PERRL, MMM, very hard of hearing    CVS: S1S2 , regular , No M/R/G appreciated  PULM: CTA B/L,  no W/R/R appreciated  ABD.: soft. non tender, non distended,  bowel sounds present  Extrem: intact pulses , no edema         ( Note written / Date of service 06-27-25 ( This is certified to be the same as "ENTERED" date above ( for billing purposes)))    ==================>> MEDICATIONS <<====================    amLODIPine   Tablet 2.5 milliGRAM(s) Oral daily  carvedilol 12.5 milliGRAM(s) Oral every 12 hours  cefTRIAXone   IVPB 1000 milliGRAM(s) IV Intermittent every 24 hours  ferrous    sulfate 325 milliGRAM(s) Oral daily  meclizine 25 milliGRAM(s) Oral daily  pantoprazole    Tablet 40 milliGRAM(s) Oral before breakfast  rosuvastatin 5 milliGRAM(s) Oral at bedtime    MEDICATIONS  (PRN):  acetaminophen     Tablet .. 650 milliGRAM(s) Oral every 6 hours PRN Temp greater or equal to 38C (100.4F), Mild Pain (1 - 3)  aluminum hydroxide/magnesium hydroxide/simethicone Suspension 30 milliLiter(s) Oral every 4 hours PRN Dyspepsia  melatonin 3 milliGRAM(s) Oral at bedtime PRN Insomnia  ondansetron Injectable 4 milliGRAM(s) IV Push every 8 hours PRN Nausea and/or Vomiting    ___________  Active diet:  Diet, DASH/TLC:   Sodium & Cholesterol Restricted  Vegan Accepts Vegetable Products Only  ___________________    ==================>> VITAL SIGNS <<==================    Vital Signs Last 24 HrsT(C): 36.7 (06-27-25 @ 10:41)  T(F): 98.1 (06-27-25 @ 10:41), Max: 98.1 (06-27-25 @ 10:41)  HR: 70 (06-27-25 @ 10:41) (66 - 77)  BP: 105/69 (06-27-25 @ 10:41)  RR: 18 (06-27-25 @ 10:41) (18 - 18)  SpO2: 96% (06-27-25 @ 10:41) (95% - 96%)       ==================>> LAB AND IMAGING <<==================                        9.0    5.94  )-----------( 173      ( 26 Jun 2025 07:00 )             29.2        06-26    136  |  105  |  17  ----------------------------<  95  4.4   |  19[L]  |  1.13    Ca    8.5      26 Jun 2025 07:00  Phos  2.8     06-26  Mg     1.8     06-26    TPro  6.0  /  Alb  3.2[L]  /  TBili  0.1[L]  /  DBili  x   /  AST  16  /  ALT  5[L]  /  AlkPhos  82  06-26    WBC count:   5.94 <<== ,  7.33 <<== ,  5.45 <<==   Hemoglobin:   9.0 <<==,  8.8 <<==,  10.0 <<==  platelets:  173 <==, 170 <==, 217 <==    Creatinine:  1.13  <<==, 1.21  <<==, 1.22  <<==, 1.72  <<==  Sodium:   136  <==, 137  <==, 134  <==, 138  <==       AST:          16(06-26) <== , 19(06-25) <== , 31(06-24) <==      ALT:        5(06-26)  <== , 7(06-25)  <== , 10(06-24)  <==      AP:        82(06-26)  <=, 68(06-25)  <=, 75(06-24)  <=     Bili:        0.1(06-26)  <=, 0.5(06-25)  <=, 0.4(06-24)  <=    ____________________________    M I C R O B I O L O G Y :    Urinalysis with Rflx Culture (collected 25 Jun 2025 08:51)    Culture - Urine (collected 25 Jun 2025 08:51)  Source: Clean Catch  Preliminary Report (27 Jun 2025 00:37):    >100,000 CFU/ml Gram Negative Rods       >> pending sensitivities        ___________________________________________________________________________________  ===============>>  A S S E S S M E N T   A N D   P L A N <<===============  ------------------------------------------------------------------------------------------    Patient is an 84yo Male with past medical history of hypertension hyperlipidemia hypothyroidism left bundle branch block status post pacemaker presented originally to ED w/multiple medical concerns including room spinning dizziness intermittently the last few days, weakness for the last 3 days.   Reported has "lost his hearing" every since seen at [facility] in february and found to have traumatic subdural hematoma. Reports feels overall weak and off-balance when dizziness occurs and is afraid of falling. No current dizziness at this time.   	In ED: Labs notable for CINDY with creatinine 1.72.  Troponin 18 and 19 on repeat.  CT head performed which did not show any acute pathology.  Trace chronic subdural hematoma again noted but not significantly changed from previous.    Patient sent to CDU for gentle IV fluid hydration and repeat labs to assess improvement of CINDY and PT eval.   CINDY resolved, dizziness resolved for the most part..   patient found to have UTI, started on antibiotics and admitted for possible INDRA placement if agreeable   Next of kin / emergency number listed is "out of service"         Problem/Plan - 1:  ·  Problem: Dizziness.   ·  Plan: appears to be vertigo type  likely UTI and dehydration / CINDY contributing as well  overly improved  treat underlying conditions  meclizine PRN  nutrition / hydration  supportive care.  ENT follow up as outpatient ( see ann )     Problem/Plan - 2:  ·  Problem: Acute UTI.   ·  Plan: continue rocephin  follow cultures   PO hydration.     Problem/Plan - 3:  ·  Problem: Anemia.   Iron deficiency   >> iron supplements ordered    monitor.  outpatient GI follow up      Problem/Plan - 4:  ·  Problem: CINDY (acute kidney injury).   ·  Plan: resolved post hydration  encourage PO intake.     Problem/Plan - 5:  ·  Problem: HTN (hypertension).   ·  Plan: Continue home medications and monitor.  adjust as needed.     Problem/Plan - 6:  ·  Problem: Hearing loss.   patient states has seen ENT and was given Rx for hearing aid but has not gotten them yet ..   to follow up as outpatient      Problem/Plan - 7:  ·  Problem: Weakness.   ·  Plan: PT follow up   patient not willing to go to [facility]  discussed with RN to assist patient OOB denice hair and ambulate  daily ..      Problem/Plan - 8:  ·  Problem: dyslipidemia    started on Crestor  outpatient follow up    -GI/DVT Prophylaxis per protocol.    --------------------------------------------  Case discussed with patient, RN, Nurse Practitioner    Education given on findings and plan of care  ___________________________  HFelicita Wharton D.O.  Pager: [phone]

## 2025-06-28 ENCOUNTER — TRANSCRIPTION ENCOUNTER (OUTPATIENT)
Age: 85
End: 2025-06-28

## 2025-06-28 LAB
-  AMOXICILLIN/CLAVULANIC ACID: SIGNIFICANT CHANGE UP
-  AMPICILLIN/SULBACTAM: SIGNIFICANT CHANGE UP
-  AMPICILLIN: SIGNIFICANT CHANGE UP
-  AZTREONAM: SIGNIFICANT CHANGE UP
-  CEFAZOLIN: SIGNIFICANT CHANGE UP
-  CEFEPIME: SIGNIFICANT CHANGE UP
-  CEFOXITIN: SIGNIFICANT CHANGE UP
-  CEFTRIAXONE: SIGNIFICANT CHANGE UP
-  CEFUROXIME: SIGNIFICANT CHANGE UP
-  CIPROFLOXACIN: SIGNIFICANT CHANGE UP
-  ERTAPENEM: SIGNIFICANT CHANGE UP
-  GENTAMICIN: SIGNIFICANT CHANGE UP
-  IMIPENEM: SIGNIFICANT CHANGE UP
-  LEVOFLOXACIN: SIGNIFICANT CHANGE UP
-  MEROPENEM: SIGNIFICANT CHANGE UP
-  NITROFURANTOIN: SIGNIFICANT CHANGE UP
-  PIPERACILLIN/TAZOBACTAM: SIGNIFICANT CHANGE UP
-  TIGECYCLINE: SIGNIFICANT CHANGE UP
-  TOBRAMYCIN: SIGNIFICANT CHANGE UP
-  TRIMETHOPRIM/SULFAMETHOXAZOLE: SIGNIFICANT CHANGE UP
CULTURE RESULTS: ABNORMAL
METHOD TYPE: SIGNIFICANT CHANGE UP
ORGANISM # SPEC MICROSCOPIC CNT: ABNORMAL
ORGANISM # SPEC MICROSCOPIC CNT: ABNORMAL
SPECIMEN SOURCE: SIGNIFICANT CHANGE UP

## 2025-06-28 RX ORDER — AMLODIPINE BESYLATE 10 MG/1
1 TABLET ORAL
Refills: 0 | DISCHARGE

## 2025-06-28 RX ORDER — MECLIZINE HCL 12.5 MG
1 TABLET ORAL
Qty: 0 | Refills: 0 | DISCHARGE
Start: 2025-06-28

## 2025-06-28 RX ORDER — CARVEDILOL 3.12 MG/1
1 TABLET, FILM COATED ORAL
Qty: 60 | Refills: 0
Start: 2025-06-28 | End: 2025-07-27

## 2025-06-28 RX ORDER — ACETAMINOPHEN 500 MG/5ML
2 LIQUID (ML) ORAL
Qty: 0 | Refills: 0 | DISCHARGE
Start: 2025-06-28

## 2025-06-28 RX ORDER — AMLODIPINE BESYLATE 10 MG/1
1 TABLET ORAL
Qty: 30 | Refills: 0
Start: 2025-06-28 | End: 2025-07-27

## 2025-06-28 RX ORDER — CARVEDILOL 3.12 MG/1
0 TABLET, FILM COATED ORAL
Refills: 0 | DISCHARGE

## 2025-06-28 RX ORDER — FERROUS SULFATE 137(45) MG
1 TABLET, EXTENDED RELEASE ORAL
Qty: 30 | Refills: 0
Start: 2025-06-28 | End: 2025-07-27

## 2025-06-28 RX ORDER — MECLIZINE HCL 12.5 MG
25 TABLET ORAL
Refills: 0 | Status: DISCONTINUED | OUTPATIENT
Start: 2025-06-28 | End: 2025-06-29

## 2025-06-28 RX ORDER — MECLIZINE HCL 12.5 MG
1 TABLET ORAL
Refills: 0 | DISCHARGE

## 2025-06-28 RX ORDER — ROSUVASTATIN CALCIUM 5 MG/1
1 TABLET, FILM COATED ORAL
Qty: 30 | Refills: 0
Start: 2025-06-28 | End: 2025-07-27

## 2025-06-28 RX ADMIN — Medication 650 MILLIGRAM(S): at 21:05

## 2025-06-28 RX ADMIN — CEFTRIAXONE 100 MILLIGRAM(S): 500 INJECTION, POWDER, FOR SOLUTION INTRAMUSCULAR; INTRAVENOUS at 12:56

## 2025-06-28 RX ADMIN — CARVEDILOL 12.5 MILLIGRAM(S): 3.12 TABLET, FILM COATED ORAL at 05:05

## 2025-06-28 RX ADMIN — AMLODIPINE BESYLATE 2.5 MILLIGRAM(S): 10 TABLET ORAL at 05:05

## 2025-06-28 RX ADMIN — Medication 325 MILLIGRAM(S): at 12:57

## 2025-06-28 RX ADMIN — Medication 650 MILLIGRAM(S): at 21:51

## 2025-06-28 RX ADMIN — CARVEDILOL 12.5 MILLIGRAM(S): 3.12 TABLET, FILM COATED ORAL at 18:09

## 2025-06-28 RX ADMIN — Medication 25 MILLIGRAM(S): at 21:06

## 2025-06-28 RX ADMIN — ROSUVASTATIN CALCIUM 5 MILLIGRAM(S): 5 TABLET, FILM COATED ORAL at 21:05

## 2025-06-28 RX ADMIN — Medication 40 MILLIGRAM(S): at 05:05

## 2025-06-28 NOTE — DISCHARGE NOTE PROVIDER - NSFOLLOWUPCLINICS_GEN_ALL_ED_FT
[facility] - Primary Care  Primary Care  [address] [place] [address]  Phone: [phone]  Fax:   Follow Up Time: Routine

## 2025-06-28 NOTE — PROGRESS NOTE ADULT - SUBJECTIVE AND OBJECTIVE BOX
Patient is a 85y old  Male who presents with a chief complaint of dizziness, UTI, possible rehab (27 Jun 2025 16:33)      DATE OF SERVICE: 06-28-25 @ 11:52    SUBJECTIVE / OVERNIGHT EVENTS: overnight events noted    ROS:  Resp: No cough no sputum production  CVS: No chest pain no palpitations no orthopnea  GI: no N/V/D  "I am still dizzy"      MEDICATIONS  (STANDING):  amLODIPine   Tablet 2.5 milliGRAM(s) Oral daily  carvedilol 12.5 milliGRAM(s) Oral every 12 hours  cefTRIAXone   IVPB 1000 milliGRAM(s) IV Intermittent every 24 hours  ferrous    sulfate 325 milliGRAM(s) Oral daily  meclizine 25 milliGRAM(s) Oral daily  pantoprazole    Tablet 40 milliGRAM(s) Oral before breakfast  rosuvastatin 5 milliGRAM(s) Oral at bedtime    MEDICATIONS  (PRN):  acetaminophen     Tablet .. 650 milliGRAM(s) Oral every 6 hours PRN Temp greater or equal to 38C (100.4F), Mild Pain (1 - 3)  aluminum hydroxide/magnesium hydroxide/simethicone Suspension 30 milliLiter(s) Oral every 4 hours PRN Dyspepsia  melatonin 3 milliGRAM(s) Oral at bedtime PRN Insomnia  ondansetron Injectable 4 milliGRAM(s) IV Push every 8 hours PRN Nausea and/or Vomiting        CAPILLARY BLOOD GLUCOSE        I&O's Summary    27 Jun 2025 07:01  -  28 Jun 2025 07:00  --------------------------------------------------------  IN: 0 mL / OUT: 1160 mL / NET: -1160 mL        Vital Signs Last 24 Hrs  T(C): 36.5 (28 Jun 2025 10:58), Max: 37 (27 Jun 2025 19:44)  T(F): 97.7 (28 Jun 2025 10:58), Max: 98.6 (27 Jun 2025 19:44)  HR: 71 (28 Jun 2025 10:58) (66 - 86)  BP: 105/58 (28 Jun 2025 10:58) (105/58 - 126/72)  BP(mean): --  RR: 18 (28 Jun 2025 10:58) (18 - 18)  SpO2: 95% (28 Jun 2025 10:58) (95% - 97%)    PHYSICAL EXAM:  CHEST/LUNG: clear   HEART: S1 S2; no murmurs   ABDOMEN: Soft, Nontender  EXTREMITIES:   no edema  NEUROLOGY: AO x 3 non-focal      LABS:                      All consultant(s) notes reviewed and care discussed with other providers        Contact Number, Dr La [phone]

## 2025-06-28 NOTE — DISCHARGE NOTE PROVIDER - CARE PROVIDER_API CALL
SYLVAIN ROSALES  [address] [place] [address]  Phone: [phone]  Fax: ()-  Follow Up Time: 1 week

## 2025-06-28 NOTE — DISCHARGE NOTE PROVIDER - NSDCCPCAREPLAN_GEN_ALL_CORE_FT
PRINCIPAL DISCHARGE DIAGNOSIS  Diagnosis: CINDY (acute kidney injury)  Assessment and Plan of Treatment: Treated with IV fluids   CINDY resolved      SECONDARY DISCHARGE DIAGNOSES  Diagnosis: Vertigo  Assessment and Plan of Treatment: Meclizine 25 mg po twice a day as needed    Diagnosis: HTN (hypertension)  Assessment and Plan of Treatment: Follow up with your medical doctor to establish long term blood pressure treatment goals.  continue Coreg   Continue Amlodipine    Diagnosis: Acute UTI  Assessment and Plan of Treatment: completed course of IV antibioitc    Diagnosis: Anemia  Assessment and Plan of Treatment: Started ferrous sulfate 325 mg po daily  Outpatient follow up with GI    Diagnosis: Hearing loss  Assessment and Plan of Treatment: Outpatient follow up with ENT    Diagnosis: Weakness  Assessment and Plan of Treatment: Outpatient PT for gait training and strengthening exercises     PRINCIPAL DISCHARGE DIAGNOSIS  Diagnosis: CINDY (acute kidney injury)  Assessment and Plan of Treatment: Treated with IV fluids   CINDY resolved      SECONDARY DISCHARGE DIAGNOSES  Diagnosis: Vertigo  Assessment and Plan of Treatment: Meclizine 25 mg po twice a day as needed    Diagnosis: Acute UTI  Assessment and Plan of Treatment: completed course of IV antibioitc    Diagnosis: HTN (hypertension)  Assessment and Plan of Treatment: Follow up with your medical doctor to establish long term blood pressure treatment goals.  continue Coreg   Continue Amlodipine    Diagnosis: Anemia  Assessment and Plan of Treatment: Started ferrous sulfate 325 mg po daily  Outpatient follow up with GI    Diagnosis: Hearing loss  Assessment and Plan of Treatment: Outpatient follow up with ENT    Diagnosis: Weakness  Assessment and Plan of Treatment: Outpatient PT for gait training and strengthening exercises

## 2025-06-28 NOTE — DISCHARGE NOTE PROVIDER - NSDCFUADDAPPT_GEN_ALL_CORE_FT
APPTS ARE READY TO BE MADE: [x] YES    Best Family or Patient Contact (if needed):    Additional Information about above appointments (if needed):    1:   2:   3:     Other comments or requests:    APPTS ARE READY TO BE MADE: [x] YES    Best Family or Patient Contact (if needed):    Additional Information about above appointments (if needed):    1: PCP  2:   3:     Other comments or requests:

## 2025-06-28 NOTE — DISCHARGE NOTE PROVIDER - NSDCCAREPROVSEEN_GEN_ALL_CORE_FT
Akiko, Mar La, Naveen HEREDIA Mar Gee      [ Greater than 35 min spent for discharge services.   ABDULAZIZ Wharton ]       ( Note written / Date of service is the same as last day of patient stay  in the hospital ( for billing purposes)))

## 2025-06-28 NOTE — DISCHARGE NOTE PROVIDER - HOSPITAL COURSE
HPI: Discharge Summary     Admit Date: 06-25-25  Discharge Date:    Admission diagnoses:   Acute renal failure    Discharge diagnoses:   Vertigo  UTI  Anemia  ICNDY  Hyponatremia  Hearing loss    Hospital Course:   An 85-year-old male with a past medical history of hypertension, hyperlipidemia, hypothyroidism, left bundle branch block with pacemaker, presented to the ED with intermittent dizziness, weakness, and hearing loss after a prior traumatic subdural hematoma. In the ED, he exhibited acute kidney injury with a creatinine of 1.72 and elevated troponin levels, managed with gentle IV hydration, resulting in resolved CINDY. CT imaging showed trace chronic subdural hematoma, unchanged from previous scans. His dizziness is primarily vertigo-related, possibly exacerbated by a newly discovered UTI and dehydration, treated with increased meclizine and supportive care. UTI management includes antibiotics and hydration, while iron supplements are started for iron deficiency anemia with a planned GI follow-up. Hypertension management continues with home medications, monitoring for adjustments, and dyslipidemia treatment includes starting Crestor. Hearing issues will be followed up with ENT care for hearing aid acquisition, and although the patient declines subacute rehab placement, efforts are made for daily mobilization with PT and RN support. Patient ambulating well with walker, discharge for home with home PT.       On day of discharge, patient is clinically stable with no new exam findings or acute symptoms compared to prior. The patient was seen by the attending physician on the date of discharge and deemed stable and acceptable for discharge. The patient's chronic medical conditions were treated accordingly per the patient's home medication regimen. The patient's medication reconciliation (with changes made to chronic medications), follow up appointments, discharge orders, instructions, and significant lab and diagnostic studies are as noted.     Discharge follow up action items:     1. Follow up with PCP in 1-2 weeks.   2. Follow up labs, path, & imaging:   3. Medication changes ***  4. On hold medications    Patient's ordered code status: Full Code    Patient disposition: Home with home PT

## 2025-06-28 NOTE — DISCHARGE NOTE PROVIDER - NSDCMRMEDTOKEN_GEN_ALL_CORE_FT
acetaminophen 325 mg oral tablet: 2 tab(s) orally every 6 hours As needed Temp greater or equal to 38C (100.4F), Mild Pain (1 - 3)  amLODIPine 2.5 mg oral tablet: 1 tab(s) orally once a day  carvedilol 12.5 mg oral tablet: 1 tab(s) orally every 12 hours  celecoxib 100 mg oral capsule: 1 cap(s) orally once a day as needed for pain  ferrous sulfate 325 mg (65 mg elemental iron) oral tablet: 1 tab(s) orally once a day  meclizine 25 mg oral tablet: 1 tab(s) orally 2 times a day  omeprazole 20 mg oral delayed release capsule: 1 cap(s) orally once a day 1/2 to 1 hour before morning meal  rosuvastatin 5 mg oral tablet: 1 tab(s) orally once a day (at bedtime)   acetaminophen 325 mg oral tablet: 2 tab(s) orally every 6 hours As needed Temp greater or equal to 38C (100.4F), Mild Pain (1 - 3)  amLODIPine 2.5 mg oral tablet: 1 tab(s) orally once a day  carvedilol 12.5 mg oral tablet: 1 tab(s) orally every 12 hours  celecoxib 100 mg oral capsule: 1 cap(s) orally once a day as needed for pain  ferrous sulfate 325 mg (65 mg elemental iron) oral tablet: 1 tab(s) orally once a day  Home Physical Therapy : Home Physical Therapy   [id number]  meclizine 25 mg oral tablet: 1 tab(s) orally 2 times a day  omeprazole 20 mg oral delayed release capsule: 1 cap(s) orally once a day 1/2 to 1 hour before morning meal  rosuvastatin 5 mg oral tablet: 1 tab(s) orally once a day (at bedtime)

## 2025-06-28 NOTE — DISCHARGE NOTE PROVIDER - NSDCFUADDINST_GEN_ALL_CORE_FT
Diet: Low salt low cholesterol Diet: Vegan- Accepts vegetables products only     Activity:  Ambulate with assistance

## 2025-06-28 NOTE — DISCHARGE NOTE PROVIDER - NSDCFUSCHEDAPPT_GEN_ALL_CORE_FT
[facility]  DI[facility] [id number]  Scheduled Appointment: 07/30/2025

## 2025-06-28 NOTE — PROGRESS NOTE ADULT - ASSESSMENT
86yo Male with past medical history of hypertension hyperlipidemia hypothyroidism left bundle branch block status post pacemaker presented originally to ED w/multiple medical concerns including room spinning dizziness intermittently the last few days, weakness for the last 3 days.   Reported has "lost his hearing" every since seen at [facility] in february and found to have traumatic subdural hematoma. Reports feels overall weak and off-balance when dizziness occurs and is afraid of falling. No current dizziness at this time.   	In ED: Labs notable for CINDY with creatinine 1.72.  Troponin 18 and 19 on repeat.  CT head performed which did not show any acute pathology.  Trace chronic subdural hematoma again noted but not significantly changed from previous.    Patient sent to CDU for gentle IV fluid hydration and repeat labs to assess improvement of CINDY and PT eval.   CINDY resolved, dizziness resolved for the most part..   patient found to have UTI, started on antibiotics and admitted for possible [facility] placement if agreeable   Next of kin / emergency number listed is "out of service"         Problem/Plan - 1:  ·  Problem: Dizziness.   ·  Plan: appears to be vertigo type  likely UTI and dehydration / CINDY contributing as well  overly improved  still c/o continued dizziness  increase meclizine to BID standing  nutrition / hydration  supportive care.  ENT follow up as outpatient ( see bellow )     Problem/Plan - 2:  ·  Problem: Acute UTI.   ·  Plan: continue rocephin  follow cultures   PO hydration.     Problem/Plan - 3:  ·  Problem: Anemia.   Iron deficiency   >> iron supplements ordered    monitor.  outpatient GI follow up      Problem/Plan - 4:  ·  Problem: CINDY (acute kidney injury).   ·  Plan: resolved post hydration  encourage PO intake.     Problem/Plan - 5:  ·  Problem: HTN (hypertension).   ·  Plan: Continue home medications and monitor.  adjust as needed.     Problem/Plan - 6:  ·  Problem: Hearing loss.   patient states has seen ENT and was given Rx for hearing aid but has not gotten them yet ..   to follow up as outpatient      Problem/Plan - 7:  ·  Problem: Weakness.   ·  Plan: PT follow up   patient not willing to go to [facility]  discussed with RN to assist patient OOB denice hair and ambulate  daily ..      Problem/Plan - 8:  ·  Problem: dyslipidemia    started on Crestor  outpatient follow up

## 2025-06-29 VITALS
RESPIRATION RATE: 18 BRPM | OXYGEN SATURATION: 97 % | DIASTOLIC BLOOD PRESSURE: 65 MMHG | HEART RATE: 77 BPM | TEMPERATURE: 98 F | SYSTOLIC BLOOD PRESSURE: 116 MMHG

## 2025-06-29 LAB
ANION GAP SERPL CALC-SCNC: 11 MMOL/L — SIGNIFICANT CHANGE UP (ref 5–17)
BUN SERPL-MCNC: 19 MG/DL — SIGNIFICANT CHANGE UP (ref 7–23)
CALCIUM SERPL-MCNC: 8.9 MG/DL — SIGNIFICANT CHANGE UP (ref 8.4–10.5)
CHLORIDE SERPL-SCNC: 100 MMOL/L — SIGNIFICANT CHANGE UP (ref 96–108)
CO2 SERPL-SCNC: 23 MMOL/L — SIGNIFICANT CHANGE UP (ref 22–31)
CREAT SERPL-MCNC: 1 MG/DL — SIGNIFICANT CHANGE UP (ref 0.5–1.3)
EGFR: 73 ML/MIN/1.73M2 — SIGNIFICANT CHANGE UP
EGFR: 73 ML/MIN/1.73M2 — SIGNIFICANT CHANGE UP
GLUCOSE SERPL-MCNC: 91 MG/DL — SIGNIFICANT CHANGE UP (ref 70–99)
HCT VFR BLD CALC: 28 % — LOW (ref 39–50)
HGB BLD-MCNC: 8.7 G/DL — LOW (ref 13–17)
MCHC RBC-ENTMCNC: 28.2 PG — SIGNIFICANT CHANGE UP (ref 27–34)
MCHC RBC-ENTMCNC: 31.1 G/DL — LOW (ref 32–36)
MCV RBC AUTO: 90.6 FL — SIGNIFICANT CHANGE UP (ref 80–100)
NRBC # BLD AUTO: 0 K/UL — SIGNIFICANT CHANGE UP (ref 0–0)
NRBC # FLD: 0 K/UL — SIGNIFICANT CHANGE UP (ref 0–0)
NRBC BLD AUTO-RTO: 0 /100 WBCS — SIGNIFICANT CHANGE UP (ref 0–0)
PLATELET # BLD AUTO: 196 K/UL — SIGNIFICANT CHANGE UP (ref 150–400)
PMV BLD: 10.1 FL — SIGNIFICANT CHANGE UP (ref 7–13)
POTASSIUM SERPL-MCNC: 4.1 MMOL/L — SIGNIFICANT CHANGE UP (ref 3.5–5.3)
POTASSIUM SERPL-SCNC: 4.1 MMOL/L — SIGNIFICANT CHANGE UP (ref 3.5–5.3)
RBC # BLD: 3.09 M/UL — LOW (ref 4.2–5.8)
RBC # FLD: 16 % — HIGH (ref 10.3–14.5)
SODIUM SERPL-SCNC: 134 MMOL/L — LOW (ref 135–145)
WBC # BLD: 5.44 K/UL — SIGNIFICANT CHANGE UP (ref 3.8–10.5)
WBC # FLD AUTO: 5.44 K/UL — SIGNIFICANT CHANGE UP (ref 3.8–10.5)

## 2025-06-29 PROCEDURE — 83550 IRON BINDING TEST: CPT

## 2025-06-29 PROCEDURE — 86780 TREPONEMA PALLIDUM: CPT

## 2025-06-29 PROCEDURE — 85027 COMPLETE CBC AUTOMATED: CPT

## 2025-06-29 PROCEDURE — 97161 PT EVAL LOW COMPLEX 20 MIN: CPT

## 2025-06-29 PROCEDURE — 86900 BLOOD TYPING SEROLOGIC ABO: CPT

## 2025-06-29 PROCEDURE — 83540 ASSAY OF IRON: CPT

## 2025-06-29 PROCEDURE — 80061 LIPID PANEL: CPT

## 2025-06-29 PROCEDURE — 80053 COMPREHEN METABOLIC PANEL: CPT

## 2025-06-29 PROCEDURE — 87077 CULTURE AEROBIC IDENTIFY: CPT

## 2025-06-29 PROCEDURE — 86850 RBC ANTIBODY SCREEN: CPT

## 2025-06-29 PROCEDURE — 86901 BLOOD TYPING SEROLOGIC RH(D): CPT

## 2025-06-29 PROCEDURE — 87086 URINE CULTURE/COLONY COUNT: CPT

## 2025-06-29 PROCEDURE — 93005 ELECTROCARDIOGRAM TRACING: CPT

## 2025-06-29 PROCEDURE — 87186 SC STD MICRODIL/AGAR DIL: CPT

## 2025-06-29 PROCEDURE — 84484 ASSAY OF TROPONIN QUANT: CPT

## 2025-06-29 PROCEDURE — 85025 COMPLETE CBC W/AUTO DIFF WBC: CPT

## 2025-06-29 PROCEDURE — 82728 ASSAY OF FERRITIN: CPT

## 2025-06-29 PROCEDURE — 80048 BASIC METABOLIC PNL TOTAL CA: CPT

## 2025-06-29 PROCEDURE — 84100 ASSAY OF PHOSPHORUS: CPT

## 2025-06-29 PROCEDURE — 82607 VITAMIN B-12: CPT

## 2025-06-29 PROCEDURE — 81001 URINALYSIS AUTO W/SCOPE: CPT

## 2025-06-29 PROCEDURE — 83735 ASSAY OF MAGNESIUM: CPT

## 2025-06-29 PROCEDURE — 82746 ASSAY OF FOLIC ACID SERUM: CPT

## 2025-06-29 PROCEDURE — 99285 EMERGENCY DEPT VISIT HI MDM: CPT

## 2025-06-29 PROCEDURE — 96374 THER/PROPH/DIAG INJ IV PUSH: CPT

## 2025-06-29 PROCEDURE — 70450 CT HEAD/BRAIN W/O DYE: CPT

## 2025-06-29 RX ADMIN — Medication 40 MILLIGRAM(S): at 04:58

## 2025-06-29 RX ADMIN — Medication 650 MILLIGRAM(S): at 09:09

## 2025-06-29 RX ADMIN — Medication 325 MILLIGRAM(S): at 17:31

## 2025-06-29 RX ADMIN — AMLODIPINE BESYLATE 2.5 MILLIGRAM(S): 10 TABLET ORAL at 04:58

## 2025-06-29 RX ADMIN — Medication 25 MILLIGRAM(S): at 17:30

## 2025-06-29 RX ADMIN — Medication 650 MILLIGRAM(S): at 10:00

## 2025-06-29 RX ADMIN — CARVEDILOL 12.5 MILLIGRAM(S): 3.12 TABLET, FILM COATED ORAL at 04:58

## 2025-06-29 RX ADMIN — CARVEDILOL 12.5 MILLIGRAM(S): 3.12 TABLET, FILM COATED ORAL at 17:31

## 2025-06-29 RX ADMIN — Medication 25 MILLIGRAM(S): at 04:58

## 2025-06-29 NOTE — PROGRESS NOTE ADULT - ASSESSMENT
M E D I C A L   A T T E N D I N G    F O L L O W    U P   N O T E  (06-29-25 )                                     ------------------------------------------------------------------------------------------------    patient evaluated by me, case discussed with team, chart, medications, and physical exam reviewed, labs / tests  and vitals reviewed by me, as bellow.   Patient is stable for discharge today. patient going home with company as planned. . discussed. finished antibiotics for UTI,, continue Iron supplements and statin for dyslipidemia ... otherwise stable.. walking well with minimal assist per nursing..   Patient to follow up with  MD, ENT, ... in his home country as discussed   See discharge document for full note (discussed with ACP).  [Greater than 35 min spent for these services. ]          ( Note written / Date of service 06-29-25 ( This is certified to be the same as "ENTERED" date above ( for billing purposes)))    ==================>> MEDICATIONS <<====================    amLODIPine   Tablet 2.5 milliGRAM(s) Oral daily  carvedilol 12.5 milliGRAM(s) Oral every 12 hours  ferrous    sulfate 325 milliGRAM(s) Oral daily  meclizine 25 milliGRAM(s) Oral two times a day  pantoprazole    Tablet 40 milliGRAM(s) Oral before breakfast  rosuvastatin 5 milliGRAM(s) Oral at bedtime    MEDICATIONS  (PRN):  acetaminophen     Tablet .. 650 milliGRAM(s) Oral every 6 hours PRN Temp greater or equal to 38C (100.4F), Mild Pain (1 - 3)  aluminum hydroxide/magnesium hydroxide/simethicone Suspension 30 milliLiter(s) Oral every 4 hours PRN Dyspepsia  melatonin 3 milliGRAM(s) Oral at bedtime PRN Insomnia  ondansetron Injectable 4 milliGRAM(s) IV Push every 8 hours PRN Nausea and/or Vomiting    ___________  Active diet:  Diet, DASH/TLC:   Sodium & Cholesterol Restricted  Vegan Accepts Vegetable Products Only  ___________________    ==================>> VITAL SIGNS <<==================    T(C): 36.6 (06-29-25 @ 10:54), Max: 36.9 (06-28-25 @ 20:44)  HR: 77 (06-29-25 @ 10:54) (65 - 83)  BP: 116/65 (06-29-25 @ 10:54) (101/63 - 123/65)  BP(mean): --  RR: 18 (06-29-25 @ 10:54) (18 - 18)  SpO2: 97% (06-29-25 @ 10:54) (95% - 97%)       I&O's Summary    28 Jun 2025 07:01  -  29 Jun 2025 07:00  --------------------------------------------------------  IN: 480 mL / OUT: 950 mL / NET: -470 mL    29 Jun 2025 07:01  -  29 Jun 2025 13:24  --------------------------------------------------------  IN: 600 mL / OUT: 550 mL / NET: 50 mL       ==================>> LAB AND IMAGING <<==================                        8.7    5.44  )-----------( 196      ( 29 Jun 2025 06:35 )             28.0        06-29    134[L]  |  100  |  19  ----------------------------<  91  4.1   |  23  |  1.00    Ca    8.9      29 Jun 2025 06:35       Urinalysis:  06-25-25 @ 08:51  Leuk. Est.: Small  Nirite: Positive  WBC: 21  Blood: Negative     salt Crystal: --     calcium crystal: --     Bili: Negative     cast: 6  color: Yellow  Bacteria: Too Numerous to count  Epith. cell: 0  Yeast: --     Ketone: --     Protein: Trace     Glucose: Negative     sperm: --     Spec.Gravity: 1.018    Lipid profile:  (06-26-25)     Total: 235     LDL  : (p)     HDL  :33     TG   :194     WBC count:   5.44 <<== ,  5.94 <<== ,  7.33 <<== ,  5.45 <<==   Hemoglobin:   8.7 <<==,  9.0 <<==,  8.8 <<==,  10.0 <<==  platelets:  196 <==, 173 <==, 170 <==, 217 <==    Creatinine:  1.00  <<==, 1.13  <<==, 1.21  <<==, 1.22  <<==, 1.72  <<==  Sodium:   134  <==, 136  <==, 137  <==, 134  <==, 138  <==       AST:          16(06-26) <== , 19(06-25) <== , 31(06-24) <==      ALT:        5(06-26)  <== , 7(06-25)  <== , 10(06-24)  <==      AP:        82(06-26)  <=, 68(06-25)  <=, 75(06-24)  <=     Bili:        0.1(06-26)  <=, 0.5(06-25)  <=, 0.4(06-24)  <=        ( Note written / Date of service 06-29-25 ( This is certified to be the same as "ENTERED" date above ( for billing Purposes )))

## 2025-06-29 NOTE — DISCHARGE NOTE NURSING/CASE MANAGEMENT/SOCIAL WORK - NSDCVIVACCINE_GEN_ALL_CORE_FT
Tdap; 21-Aug-2018 03:21; Ruthann Soriano (RN); [employer]; [id number]; IntraMuscular; Deltoid Left.; 0.5 milliLiter(s); VIS (VIS Published: 09-May-2013, VIS Presented: 21-Aug-2018);

## 2025-06-29 NOTE — PROGRESS NOTE ADULT - REASON FOR ADMISSION
dizziness, UTI, possible rehab

## 2025-06-29 NOTE — DISCHARGE NOTE NURSING/CASE MANAGEMENT/SOCIAL WORK - FINANCIAL ASSISTANCE
[facility] provides services at a reduced cost to those who are determined to be eligible through [facility]’s financial assistance program. Information regarding [facility]’s financial assistance program can be found by going to [email or web address] or by calling [phone].

## 2025-06-29 NOTE — DISCHARGE NOTE NURSING/CASE MANAGEMENT/SOCIAL WORK - PATIENT PORTAL LINK FT
You can access the FollowMyHealth Patient Portal offered by [facility] by registering at the following website: [email or web address]. By joining [facility]’s FollowMyHealth portal, you will also be able to view your health information using other applications (apps) compatible with our system.

## 2025-06-29 NOTE — DISCHARGE NOTE NURSING/CASE MANAGEMENT/SOCIAL WORK - NSDCPEFALRISK_GEN_ALL_CORE
For information on Fall & Injury Prevention, visit: [email or web address] OR  [email or web address] OR  https://www.cdc.gov/steadi/patient.html

## 2025-07-08 ENCOUNTER — INPATIENT (INPATIENT)
Facility: HOSPITAL | Age: 85
LOS: 3 days | Discharge: HOME | End: 2025-07-12
Attending: GENERAL PRACTICE | Admitting: GENERAL PRACTICE
Payer: MEDICARE

## 2025-07-08 VITALS
TEMPERATURE: 98 F | RESPIRATION RATE: 18 BRPM | HEIGHT: 60 IN | DIASTOLIC BLOOD PRESSURE: 90 MMHG | SYSTOLIC BLOOD PRESSURE: 158 MMHG | HEART RATE: 75 BPM | OXYGEN SATURATION: 100 %

## 2025-07-08 PROCEDURE — 99291 CRITICAL CARE FIRST HOUR: CPT

## 2025-07-09 DIAGNOSIS — R55 SYNCOPE AND COLLAPSE: ICD-10-CM

## 2025-07-09 DIAGNOSIS — S02.92XA UNSPECIFIED FRACTURE OF FACIAL BONES, INITIAL ENCOUNTER FOR CLOSED FRACTURE: ICD-10-CM
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
## 2025-07-09 DIAGNOSIS — S01.511A LACERATION WITHOUT FOREIGN BODY OF LIP, INITIAL ENCOUNTER: ICD-10-CM

## 2025-07-09 LAB
ALBUMIN SERPL ELPH-MCNC: 4 G/DL — SIGNIFICANT CHANGE UP (ref 3.3–5)
ALP SERPL-CCNC: 70 U/L — SIGNIFICANT CHANGE UP (ref 40–120)
ALT FLD-CCNC: 11 U/L — SIGNIFICANT CHANGE UP (ref 4–41)
ANION GAP SERPL CALC-SCNC: 14 MMOL/L — SIGNIFICANT CHANGE UP (ref 7–14)
APPEARANCE UR: CLEAR — SIGNIFICANT CHANGE UP
APTT BLD: 27.4 SEC — SIGNIFICANT CHANGE UP (ref 26.1–36.8)
AST SERPL-CCNC: 24 U/L — SIGNIFICANT CHANGE UP (ref 4–40)
BACTERIA # UR AUTO: NEGATIVE /HPF — SIGNIFICANT CHANGE UP
BASOPHILS # BLD AUTO: 0.06 K/UL — SIGNIFICANT CHANGE UP (ref 0–0.2)
BASOPHILS NFR BLD AUTO: 0.5 % — SIGNIFICANT CHANGE UP (ref 0–2)
BILIRUB SERPL-MCNC: 0.3 MG/DL — SIGNIFICANT CHANGE UP (ref 0.2–1.2)
BILIRUB UR-MCNC: NEGATIVE — SIGNIFICANT CHANGE UP
BUN SERPL-MCNC: 22 MG/DL — SIGNIFICANT CHANGE UP (ref 7–23)
CALCIUM SERPL-MCNC: 9 MG/DL — SIGNIFICANT CHANGE UP (ref 8.4–10.5)
CAST: 0 /LPF — SIGNIFICANT CHANGE UP (ref 0–4)
CHLORIDE SERPL-SCNC: 97 MMOL/L — LOW (ref 98–107)
CO2 SERPL-SCNC: 22 MMOL/L — SIGNIFICANT CHANGE UP (ref 22–31)
COLOR SPEC: YELLOW — SIGNIFICANT CHANGE UP
CREAT SERPL-MCNC: 0.99 MG/DL — SIGNIFICANT CHANGE UP (ref 0.5–1.3)
DIFF PNL FLD: NEGATIVE — SIGNIFICANT CHANGE UP
EGFR: 74 ML/MIN/1.73M2 — SIGNIFICANT CHANGE UP
EGFR: 74 ML/MIN/1.73M2 — SIGNIFICANT CHANGE UP
EOSINOPHIL # BLD AUTO: 0.04 K/UL — SIGNIFICANT CHANGE UP (ref 0–0.5)
EOSINOPHIL NFR BLD AUTO: 0.3 % — SIGNIFICANT CHANGE UP (ref 0–6)
ETHANOL SERPL-MCNC: <10 MG/DL — SIGNIFICANT CHANGE UP
GLUCOSE SERPL-MCNC: 135 MG/DL — HIGH (ref 70–99)
GLUCOSE UR QL: NEGATIVE MG/DL — SIGNIFICANT CHANGE UP
HCT VFR BLD CALC: 28.1 % — LOW (ref 39–50)
HGB BLD-MCNC: 9 G/DL — LOW (ref 13–17)
IMM GRANULOCYTES # BLD AUTO: 0.05 K/UL — SIGNIFICANT CHANGE UP (ref 0–0.07)
IMM GRANULOCYTES NFR BLD AUTO: 0.4 % — SIGNIFICANT CHANGE UP (ref 0–0.9)
INR BLD: 1.08 RATIO — SIGNIFICANT CHANGE UP (ref 0.85–1.16)
KETONES UR QL: NEGATIVE MG/DL — SIGNIFICANT CHANGE UP
LEUKOCYTE ESTERASE UR-ACNC: NEGATIVE — SIGNIFICANT CHANGE UP
LYMPHOCYTES # BLD AUTO: 0.61 K/UL — LOW (ref 1–3.3)
LYMPHOCYTES NFR BLD AUTO: 4.9 % — LOW (ref 13–44)
MCHC RBC-ENTMCNC: 29.1 PG — SIGNIFICANT CHANGE UP (ref 27–34)
MCHC RBC-ENTMCNC: 32 G/DL — SIGNIFICANT CHANGE UP (ref 32–36)
MCV RBC AUTO: 90.9 FL — SIGNIFICANT CHANGE UP (ref 80–100)
MONOCYTES # BLD AUTO: 0.63 K/UL — SIGNIFICANT CHANGE UP (ref 0–0.9)
MONOCYTES NFR BLD AUTO: 5 % — SIGNIFICANT CHANGE UP (ref 2–14)
NEUTROPHILS # BLD AUTO: 11.17 K/UL — HIGH (ref 1.8–7.4)
NEUTROPHILS NFR BLD AUTO: 88.9 % — HIGH (ref 43–77)
NITRITE UR-MCNC: NEGATIVE — SIGNIFICANT CHANGE UP
NRBC # BLD AUTO: 0 K/UL — SIGNIFICANT CHANGE UP (ref 0–0)
NRBC # FLD: 0 K/UL — SIGNIFICANT CHANGE UP (ref 0–0)
NRBC BLD AUTO-RTO: 0 /100 WBCS — SIGNIFICANT CHANGE UP (ref 0–0)
PH UR: 7.5 — SIGNIFICANT CHANGE UP (ref 5–8)
PLATELET # BLD AUTO: 195 K/UL — SIGNIFICANT CHANGE UP (ref 150–400)
PMV BLD: 9.3 FL — SIGNIFICANT CHANGE UP (ref 7–13)
POTASSIUM SERPL-MCNC: 3.8 MMOL/L — SIGNIFICANT CHANGE UP (ref 3.5–5.3)
POTASSIUM SERPL-SCNC: 3.8 MMOL/L — SIGNIFICANT CHANGE UP (ref 3.5–5.3)
PROT SERPL-MCNC: 7.1 G/DL — SIGNIFICANT CHANGE UP (ref 6–8.3)
PROT UR-MCNC: SIGNIFICANT CHANGE UP MG/DL
PROTHROM AB SERPL-ACNC: 12.8 SEC — SIGNIFICANT CHANGE UP (ref 9.9–13.4)
RBC # BLD: 3.09 M/UL — LOW (ref 4.2–5.8)
RBC # FLD: 16.8 % — HIGH (ref 10.3–14.5)
RBC CASTS # UR COMP ASSIST: 1 /HPF — SIGNIFICANT CHANGE UP (ref 0–4)
SODIUM SERPL-SCNC: 133 MMOL/L — LOW (ref 135–145)
SP GR SPEC: 1.01 — SIGNIFICANT CHANGE UP (ref 1–1.03)
SQUAMOUS # UR AUTO: 0 /HPF — SIGNIFICANT CHANGE UP (ref 0–5)
UROBILINOGEN FLD QL: 0.2 MG/DL — SIGNIFICANT CHANGE UP (ref 0.2–1)
WBC # BLD: 12.56 K/UL — HIGH (ref 3.8–10.5)
WBC # FLD AUTO: 12.56 K/UL — HIGH (ref 3.8–10.5)
WBC UR QL: 0 /HPF — SIGNIFICANT CHANGE UP (ref 0–5)

## 2025-07-09 PROCEDURE — 72125 CT NECK SPINE W/O DYE: CPT | Mod: 26

## 2025-07-09 PROCEDURE — 70486 CT MAXILLOFACIAL W/O DYE: CPT | Mod: 26

## 2025-07-09 PROCEDURE — 71045 X-RAY EXAM CHEST 1 VIEW: CPT | Mod: 26

## 2025-07-09 PROCEDURE — 29105 APPLICATION LONG ARM SPLINT: CPT | Mod: RT

## 2025-07-09 PROCEDURE — 70450 CT HEAD/BRAIN W/O DYE: CPT | Mod: 26

## 2025-07-09 PROCEDURE — 73030 X-RAY EXAM OF SHOULDER: CPT | Mod: 26,RT

## 2025-07-09 PROCEDURE — 99232 SBSQ HOSP IP/OBS MODERATE 35: CPT | Mod: FS

## 2025-07-09 PROCEDURE — 73080 X-RAY EXAM OF ELBOW: CPT | Mod: 26,RT

## 2025-07-09 RX ORDER — AMOXICILLIN AND CLAVULANATE POTASSIUM 500; 125 MG/1; MG/1
1 TABLET, FILM COATED ORAL EVERY 12 HOURS
Refills: 0 | Status: DISCONTINUED | OUTPATIENT
Start: 2025-07-09 | End: 2025-07-12

## 2025-07-09 RX ORDER — ONDANSETRON HCL/PF 4 MG/2 ML
4 VIAL (ML) INJECTION EVERY 8 HOURS
Refills: 0 | Status: DISCONTINUED | OUTPATIENT
Start: 2025-07-09 | End: 2025-07-12

## 2025-07-09 RX ORDER — FERROUS SULFATE 137(45) MG
325 TABLET, EXTENDED RELEASE ORAL DAILY
Refills: 0 | Status: DISCONTINUED | OUTPATIENT
Start: 2025-07-09 | End: 2025-07-12

## 2025-07-09 RX ORDER — ROSUVASTATIN CALCIUM 20 MG/1
5 TABLET, FILM COATED ORAL AT BEDTIME
Refills: 0 | Status: DISCONTINUED | OUTPATIENT
Start: 2025-07-09 | End: 2025-07-12

## 2025-07-09 RX ORDER — MAGNESIUM, ALUMINUM HYDROXIDE 200-200 MG
30 TABLET,CHEWABLE ORAL EVERY 4 HOURS
Refills: 0 | Status: DISCONTINUED | OUTPATIENT
Start: 2025-07-09 | End: 2025-07-12

## 2025-07-09 RX ORDER — ACETAMINOPHEN 500 MG/5ML
650 LIQUID (ML) ORAL EVERY 6 HOURS
Refills: 0 | Status: DISCONTINUED | OUTPATIENT
Start: 2025-07-09 | End: 2025-07-12

## 2025-07-09 RX ORDER — HEPARIN SODIUM 1000 [USP'U]/ML
5000 INJECTION INTRAVENOUS; SUBCUTANEOUS EVERY 12 HOURS
Refills: 0 | Status: DISCONTINUED | OUTPATIENT
Start: 2025-07-09 | End: 2025-07-12

## 2025-07-09 RX ORDER — MELATONIN 5 MG
3 TABLET ORAL AT BEDTIME
Refills: 0 | Status: DISCONTINUED | OUTPATIENT
Start: 2025-07-09 | End: 2025-07-12

## 2025-07-09 RX ORDER — CARVEDILOL 3.12 MG/1
12.5 TABLET, FILM COATED ORAL EVERY 12 HOURS
Refills: 0 | Status: DISCONTINUED | OUTPATIENT
Start: 2025-07-09 | End: 2025-07-12

## 2025-07-09 RX ORDER — AMLODIPINE BESYLATE 10 MG/1
2.5 TABLET ORAL DAILY
Refills: 0 | Status: DISCONTINUED | OUTPATIENT
Start: 2025-07-09 | End: 2025-07-12

## 2025-07-09 RX ORDER — ACETAMINOPHEN 500 MG/5ML
560 LIQUID (ML) ORAL ONCE
Refills: 0 | Status: COMPLETED | OUTPATIENT
Start: 2025-07-09 | End: 2025-07-09

## 2025-07-09 RX ORDER — MECLIZINE HCL 12.5 MG
25 TABLET ORAL
Refills: 0 | Status: DISCONTINUED | OUTPATIENT
Start: 2025-07-09 | End: 2025-07-12

## 2025-07-09 RX ORDER — ACETAMINOPHEN 500 MG/5ML
1000 LIQUID (ML) ORAL ONCE
Refills: 0 | Status: DISCONTINUED | OUTPATIENT
Start: 2025-07-09 | End: 2025-07-09

## 2025-07-09 RX ADMIN — HEPARIN SODIUM 5000 UNIT(S): 1000 INJECTION INTRAVENOUS; SUBCUTANEOUS at 17:34

## 2025-07-09 RX ADMIN — CARVEDILOL 12.5 MILLIGRAM(S): 3.12 TABLET, FILM COATED ORAL at 17:34

## 2025-07-09 RX ADMIN — Medication 325 MILLIGRAM(S): at 17:34

## 2025-07-09 RX ADMIN — Medication 40 MILLIGRAM(S): at 17:34

## 2025-07-09 RX ADMIN — Medication 224 MILLIGRAM(S): at 05:31

## 2025-07-09 RX ADMIN — Medication 25 MILLIGRAM(S): at 17:34

## 2025-07-09 RX ADMIN — Medication 1000 MILLILITER(S): at 12:32

## 2025-07-09 RX ADMIN — ROSUVASTATIN CALCIUM 5 MILLIGRAM(S): 20 TABLET, FILM COATED ORAL at 23:00

## 2025-07-10 ENCOUNTER — RESULT REVIEW (OUTPATIENT)
Age: 85
End: 2025-07-10

## 2025-07-10 DIAGNOSIS — S06.5XAD TRAUMATIC SUBDURAL HEMORRHAGE WITH LOSS OF CONSCIOUSNESS STATUS UNKNOWN, SUBSEQUENT ENCOUNTER: ICD-10-CM

## 2025-07-10 DIAGNOSIS — E03.9 HYPOTHYROIDISM, UNSPECIFIED: ICD-10-CM

## 2025-07-10 DIAGNOSIS — I10 ESSENTIAL (PRIMARY) HYPERTENSION: ICD-10-CM

## 2025-07-10 DIAGNOSIS — H91.90 UNSPECIFIED HEARING LOSS, UNSPECIFIED EAR: ICD-10-CM

## 2025-07-10 DIAGNOSIS — D64.9 ANEMIA, UNSPECIFIED: ICD-10-CM

## 2025-07-10 LAB
ALBUMIN SERPL ELPH-MCNC: 3.5 G/DL — SIGNIFICANT CHANGE UP (ref 3.3–5)
ALP SERPL-CCNC: 56 U/L — SIGNIFICANT CHANGE UP (ref 40–120)
ALT FLD-CCNC: 8 U/L — SIGNIFICANT CHANGE UP (ref 4–41)
ANION GAP SERPL CALC-SCNC: 13 MMOL/L — SIGNIFICANT CHANGE UP (ref 7–14)
AST SERPL-CCNC: 17 U/L — SIGNIFICANT CHANGE UP (ref 4–40)
BILIRUB SERPL-MCNC: 0.6 MG/DL — SIGNIFICANT CHANGE UP (ref 0.2–1.2)
BUN SERPL-MCNC: 16 MG/DL — SIGNIFICANT CHANGE UP (ref 7–23)
CALCIUM SERPL-MCNC: 8.6 MG/DL — SIGNIFICANT CHANGE UP (ref 8.4–10.5)
CHLORIDE SERPL-SCNC: 101 MMOL/L — SIGNIFICANT CHANGE UP (ref 98–107)
CHOLEST SERPL-MCNC: 184 MG/DL — SIGNIFICANT CHANGE UP
CO2 SERPL-SCNC: 20 MMOL/L — LOW (ref 22–31)
CREAT SERPL-MCNC: 0.91 MG/DL — SIGNIFICANT CHANGE UP (ref 0.5–1.3)
EGFR: 82 ML/MIN/1.73M2 — SIGNIFICANT CHANGE UP
EGFR: 82 ML/MIN/1.73M2 — SIGNIFICANT CHANGE UP
GLUCOSE SERPL-MCNC: 110 MG/DL — HIGH (ref 70–99)
HCT VFR BLD CALC: 26.2 % — LOW (ref 39–50)
HDLC SERPL-MCNC: 46 MG/DL — SIGNIFICANT CHANGE UP
HGB BLD-MCNC: 8.4 G/DL — LOW (ref 13–17)
LDLC SERPL-MCNC: 117 MG/DL — HIGH
LIPID PNL WITH DIRECT LDL SERPL: 117 MG/DL — HIGH
MCHC RBC-ENTMCNC: 29.4 PG — SIGNIFICANT CHANGE UP (ref 27–34)
MCHC RBC-ENTMCNC: 32.1 G/DL — SIGNIFICANT CHANGE UP (ref 32–36)
MCV RBC AUTO: 91.6 FL — SIGNIFICANT CHANGE UP (ref 80–100)
NONHDLC SERPL-MCNC: 138 MG/DL — HIGH
NRBC # BLD AUTO: 0 K/UL — SIGNIFICANT CHANGE UP (ref 0–0)
NRBC # FLD: 0 K/UL — SIGNIFICANT CHANGE UP (ref 0–0)
NRBC BLD AUTO-RTO: 0 /100 WBCS — SIGNIFICANT CHANGE UP (ref 0–0)
PLATELET # BLD AUTO: 160 K/UL — SIGNIFICANT CHANGE UP (ref 150–400)
PMV BLD: 10.2 FL — SIGNIFICANT CHANGE UP (ref 7–13)
POTASSIUM SERPL-MCNC: 3.7 MMOL/L — SIGNIFICANT CHANGE UP (ref 3.5–5.3)
POTASSIUM SERPL-SCNC: 3.7 MMOL/L — SIGNIFICANT CHANGE UP (ref 3.5–5.3)
PROT SERPL-MCNC: 6.4 G/DL — SIGNIFICANT CHANGE UP (ref 6–8.3)
RBC # BLD: 2.86 M/UL — LOW (ref 4.2–5.8)
RBC # FLD: 17.2 % — HIGH (ref 10.3–14.5)
SODIUM SERPL-SCNC: 134 MMOL/L — LOW (ref 135–145)
TRIGL SERPL-MCNC: 119 MG/DL — SIGNIFICANT CHANGE UP
WBC # BLD: 9.38 K/UL — SIGNIFICANT CHANGE UP (ref 3.8–10.5)
WBC # FLD AUTO: 9.38 K/UL — SIGNIFICANT CHANGE UP (ref 3.8–10.5)

## 2025-07-10 PROCEDURE — 93306 TTE W/DOPPLER COMPLETE: CPT | Mod: 26

## 2025-07-10 RX ORDER — SODIUM CHLORIDE 0.65 %
1 AEROSOL, SPRAY (ML) NASAL
Refills: 0 | Status: DISCONTINUED | OUTPATIENT
Start: 2025-07-10 | End: 2025-07-12

## 2025-07-10 RX ORDER — B1/B2/B3/B5/B6/B12/VIT C/FOLIC 500-0.5 MG
1 TABLET ORAL DAILY
Refills: 0 | Status: DISCONTINUED | OUTPATIENT
Start: 2025-07-10 | End: 2025-07-12

## 2025-07-10 RX ADMIN — HEPARIN SODIUM 5000 UNIT(S): 1000 INJECTION INTRAVENOUS; SUBCUTANEOUS at 17:59

## 2025-07-10 RX ADMIN — Medication 325 MILLIGRAM(S): at 12:55

## 2025-07-10 RX ADMIN — Medication 25 MILLIGRAM(S): at 17:59

## 2025-07-10 RX ADMIN — AMLODIPINE BESYLATE 2.5 MILLIGRAM(S): 10 TABLET ORAL at 05:39

## 2025-07-10 RX ADMIN — Medication 1 APPLICATION(S): at 05:39

## 2025-07-10 RX ADMIN — Medication 1 TABLET(S): at 21:25

## 2025-07-10 RX ADMIN — AMOXICILLIN AND CLAVULANATE POTASSIUM 1 TABLET(S): 500; 125 TABLET, FILM COATED ORAL at 05:38

## 2025-07-10 RX ADMIN — ROSUVASTATIN CALCIUM 5 MILLIGRAM(S): 20 TABLET, FILM COATED ORAL at 21:21

## 2025-07-10 RX ADMIN — HEPARIN SODIUM 5000 UNIT(S): 1000 INJECTION INTRAVENOUS; SUBCUTANEOUS at 05:37

## 2025-07-10 RX ADMIN — AMOXICILLIN AND CLAVULANATE POTASSIUM 1 TABLET(S): 500; 125 TABLET, FILM COATED ORAL at 17:59

## 2025-07-10 RX ADMIN — CARVEDILOL 12.5 MILLIGRAM(S): 3.12 TABLET, FILM COATED ORAL at 17:59

## 2025-07-10 RX ADMIN — Medication 3 MILLIGRAM(S): at 21:22

## 2025-07-10 RX ADMIN — Medication 1 APPLICATION(S): at 12:55

## 2025-07-10 RX ADMIN — Medication 40 MILLIGRAM(S): at 05:36

## 2025-07-10 RX ADMIN — CARVEDILOL 12.5 MILLIGRAM(S): 3.12 TABLET, FILM COATED ORAL at 05:36

## 2025-07-10 RX ADMIN — Medication 25 MILLIGRAM(S): at 05:36

## 2025-07-10 RX ADMIN — Medication 1 SPRAY(S): at 22:12

## 2025-07-11 LAB
ADD ON TEST-SPECIMEN IN LAB: SIGNIFICANT CHANGE UP
ANION GAP SERPL CALC-SCNC: 10 MMOL/L — SIGNIFICANT CHANGE UP (ref 7–14)
BUN SERPL-MCNC: 22 MG/DL — SIGNIFICANT CHANGE UP (ref 7–23)
CALCIUM SERPL-MCNC: 8.5 MG/DL — SIGNIFICANT CHANGE UP (ref 8.4–10.5)
CHLORIDE SERPL-SCNC: 101 MMOL/L — SIGNIFICANT CHANGE UP (ref 98–107)
CO2 SERPL-SCNC: 21 MMOL/L — LOW (ref 22–31)
CREAT SERPL-MCNC: 0.86 MG/DL — SIGNIFICANT CHANGE UP (ref 0.5–1.3)
CULTURE RESULTS: SIGNIFICANT CHANGE UP
EGFR: 84 ML/MIN/1.73M2 — SIGNIFICANT CHANGE UP
EGFR: 84 ML/MIN/1.73M2 — SIGNIFICANT CHANGE UP
GLUCOSE SERPL-MCNC: 124 MG/DL — HIGH (ref 70–99)
HCT VFR BLD CALC: 25.5 % — LOW (ref 39–50)
HGB BLD-MCNC: 8.1 G/DL — LOW (ref 13–17)
MAGNESIUM SERPL-MCNC: 1.7 MG/DL — SIGNIFICANT CHANGE UP (ref 1.6–2.6)
MCHC RBC-ENTMCNC: 29 PG — SIGNIFICANT CHANGE UP (ref 27–34)
MCHC RBC-ENTMCNC: 31.8 G/DL — LOW (ref 32–36)
MCV RBC AUTO: 91.4 FL — SIGNIFICANT CHANGE UP (ref 80–100)
NRBC # BLD AUTO: 0 K/UL — SIGNIFICANT CHANGE UP (ref 0–0)
NRBC # FLD: 0 K/UL — SIGNIFICANT CHANGE UP (ref 0–0)
NRBC BLD AUTO-RTO: 0 /100 WBCS — SIGNIFICANT CHANGE UP (ref 0–0)
PLATELET # BLD AUTO: 171 K/UL — SIGNIFICANT CHANGE UP (ref 150–400)
PMV BLD: 10.1 FL — SIGNIFICANT CHANGE UP (ref 7–13)
POTASSIUM SERPL-MCNC: 4.6 MMOL/L — SIGNIFICANT CHANGE UP (ref 3.5–5.3)
POTASSIUM SERPL-SCNC: 4.6 MMOL/L — SIGNIFICANT CHANGE UP (ref 3.5–5.3)
RBC # BLD: 2.79 M/UL — LOW (ref 4.2–5.8)
RBC # FLD: 17 % — HIGH (ref 10.3–14.5)
SODIUM SERPL-SCNC: 132 MMOL/L — LOW (ref 135–145)
SPECIMEN SOURCE: SIGNIFICANT CHANGE UP
T3 SERPL-MCNC: 79 NG/DL — LOW (ref 80–200)
T4 FREE SERPL-MCNC: 1 NG/DL — SIGNIFICANT CHANGE UP (ref 0.9–1.8)
TSH SERPL-MCNC: 10.16 UIU/ML — HIGH (ref 0.27–4.2)
WBC # BLD: 9.77 K/UL — SIGNIFICANT CHANGE UP (ref 3.8–10.5)
WBC # FLD AUTO: 9.77 K/UL — SIGNIFICANT CHANGE UP (ref 3.8–10.5)

## 2025-07-11 PROCEDURE — 99222 1ST HOSP IP/OBS MODERATE 55: CPT | Mod: GC

## 2025-07-11 RX ORDER — B1/B2/B3/B5/B6/B12/VIT C/FOLIC 500-0.5 MG
1 TABLET ORAL
Qty: 0 | Refills: 0 | DISCHARGE
Start: 2025-07-11

## 2025-07-11 RX ORDER — CELECOXIB 50 MG/1
1 CAPSULE ORAL
Refills: 0 | DISCHARGE

## 2025-07-11 RX ORDER — LEVOTHYROXINE SODIUM 300 MCG
25 TABLET ORAL DAILY
Refills: 0 | Status: DISCONTINUED | OUTPATIENT
Start: 2025-07-11 | End: 2025-07-12

## 2025-07-11 RX ORDER — AMOXICILLIN AND CLAVULANATE POTASSIUM 500; 125 MG/1; MG/1
1 TABLET, FILM COATED ORAL
Qty: 9 | Refills: 0
Start: 2025-07-11 | End: 2025-07-15

## 2025-07-11 RX ORDER — LEVOTHYROXINE SODIUM 300 MCG
1 TABLET ORAL
Qty: 30 | Refills: 0
Start: 2025-07-11 | End: 2025-08-09

## 2025-07-11 RX ORDER — SODIUM CHLORIDE 0.65 %
2 AEROSOL, SPRAY (ML) NASAL
Qty: 0 | Refills: 0 | DISCHARGE
Start: 2025-07-11

## 2025-07-11 RX ADMIN — Medication 1 APPLICATION(S): at 13:02

## 2025-07-11 RX ADMIN — CARVEDILOL 12.5 MILLIGRAM(S): 3.12 TABLET, FILM COATED ORAL at 18:50

## 2025-07-11 RX ADMIN — Medication 1 APPLICATION(S): at 05:49

## 2025-07-11 RX ADMIN — Medication 325 MILLIGRAM(S): at 13:03

## 2025-07-11 RX ADMIN — AMOXICILLIN AND CLAVULANATE POTASSIUM 1 TABLET(S): 500; 125 TABLET, FILM COATED ORAL at 05:49

## 2025-07-11 RX ADMIN — CARVEDILOL 12.5 MILLIGRAM(S): 3.12 TABLET, FILM COATED ORAL at 05:49

## 2025-07-11 RX ADMIN — Medication 40 MILLIGRAM(S): at 05:49

## 2025-07-11 RX ADMIN — ROSUVASTATIN CALCIUM 5 MILLIGRAM(S): 20 TABLET, FILM COATED ORAL at 21:34

## 2025-07-11 RX ADMIN — Medication 1 TABLET(S): at 13:05

## 2025-07-11 RX ADMIN — AMLODIPINE BESYLATE 2.5 MILLIGRAM(S): 10 TABLET ORAL at 05:49

## 2025-07-11 RX ADMIN — Medication 1 SPRAY(S): at 05:57

## 2025-07-11 RX ADMIN — Medication 25 MILLIGRAM(S): at 18:50

## 2025-07-11 RX ADMIN — AMOXICILLIN AND CLAVULANATE POTASSIUM 1 TABLET(S): 500; 125 TABLET, FILM COATED ORAL at 18:48

## 2025-07-11 RX ADMIN — HEPARIN SODIUM 5000 UNIT(S): 1000 INJECTION INTRAVENOUS; SUBCUTANEOUS at 05:49

## 2025-07-11 RX ADMIN — Medication 25 MILLIGRAM(S): at 05:48

## 2025-07-11 RX ADMIN — HEPARIN SODIUM 5000 UNIT(S): 1000 INJECTION INTRAVENOUS; SUBCUTANEOUS at 18:47

## 2025-07-12 ENCOUNTER — TRANSCRIPTION ENCOUNTER (OUTPATIENT)
Age: 85
End: 2025-07-12

## 2025-07-12 VITALS
RESPIRATION RATE: 17 BRPM | HEART RATE: 74 BPM | TEMPERATURE: 98 F | SYSTOLIC BLOOD PRESSURE: 103 MMHG | DIASTOLIC BLOOD PRESSURE: 51 MMHG | OXYGEN SATURATION: 100 %

## 2025-07-12 LAB
-  AMPICILLIN: SIGNIFICANT CHANGE UP
-  CIPROFLOXACIN: SIGNIFICANT CHANGE UP
-  LEVOFLOXACIN: SIGNIFICANT CHANGE UP
-  NITROFURANTOIN: SIGNIFICANT CHANGE UP
-  TETRACYCLINE: SIGNIFICANT CHANGE UP
-  VANCOMYCIN: SIGNIFICANT CHANGE UP
CULTURE RESULTS: ABNORMAL
METHOD TYPE: SIGNIFICANT CHANGE UP
ORGANISM # SPEC MICROSCOPIC CNT: ABNORMAL
ORGANISM # SPEC MICROSCOPIC CNT: ABNORMAL
SPECIMEN SOURCE: SIGNIFICANT CHANGE UP

## 2025-07-12 RX ADMIN — Medication 325 MILLIGRAM(S): at 12:04

## 2025-07-12 RX ADMIN — Medication 25 MICROGRAM(S): at 04:04

## 2025-07-12 RX ADMIN — Medication 1 APPLICATION(S): at 05:03

## 2025-07-12 RX ADMIN — Medication 40 MILLIGRAM(S): at 05:03

## 2025-07-12 RX ADMIN — CARVEDILOL 12.5 MILLIGRAM(S): 3.12 TABLET, FILM COATED ORAL at 05:04

## 2025-07-12 RX ADMIN — Medication 1 APPLICATION(S): at 12:07

## 2025-07-12 RX ADMIN — AMOXICILLIN AND CLAVULANATE POTASSIUM 1 TABLET(S): 500; 125 TABLET, FILM COATED ORAL at 05:03

## 2025-07-12 RX ADMIN — Medication 25 MILLIGRAM(S): at 05:03

## 2025-07-12 RX ADMIN — Medication 1 TABLET(S): at 12:04

## 2025-07-12 RX ADMIN — AMLODIPINE BESYLATE 2.5 MILLIGRAM(S): 10 TABLET ORAL at 05:04

## 2025-07-12 RX ADMIN — HEPARIN SODIUM 5000 UNIT(S): 1000 INJECTION INTRAVENOUS; SUBCUTANEOUS at 05:03

## 2025-07-17 ENCOUNTER — APPOINTMENT (OUTPATIENT)
Age: 85
End: 2025-07-17

## 2025-07-19 ENCOUNTER — EMERGENCY (EMERGENCY)
Facility: HOSPITAL | Age: 85
LOS: 1 days | End: 2025-07-19
Attending: EMERGENCY MEDICINE | Admitting: EMERGENCY MEDICINE
Payer: MEDICARE

## 2025-07-19 VITALS
OXYGEN SATURATION: 98 % | DIASTOLIC BLOOD PRESSURE: 89 MMHG | HEIGHT: 60 IN | RESPIRATION RATE: 16 BRPM | TEMPERATURE: 98 F | HEART RATE: 80 BPM | WEIGHT: 80.91 LBS | SYSTOLIC BLOOD PRESSURE: 136 MMHG

## 2025-07-19 PROCEDURE — 99284 EMERGENCY DEPT VISIT MOD MDM: CPT | Mod: GC

## 2025-07-19 RX ORDER — ACETAMINOPHEN 500 MG/5ML
975 LIQUID (ML) ORAL ONCE
Refills: 0 | Status: COMPLETED | OUTPATIENT
Start: 2025-07-19 | End: 2025-07-19

## 2025-07-19 RX ADMIN — Medication 975 MILLIGRAM(S): at 22:15

## 2025-07-19 NOTE — ED PROVIDER NOTE - ATTENDING CONTRIBUTION TO CARE
Attending note:   After face to face evaluation of this patient, I concur with above noted hx, pe, and care plan for this patient. La: 85-year-old male with history of CHF and chronic dizziness and hypothyroidism.  Patient states.  Patient recently having multiple facial fractures, dental fractures and subdural hematoma.  Patient was discharged and states that 2 days ago he noticed a metal wire sticking out of his mouth and this made it difficult to eat.  Patient denies any repeat fall since leaving the hospital.  This is different from triage note as patient is very hard of hearing and this had to be clarified.  On exam there is no ecchymosis or tenderness on scalp.  There is no midline spinal tenderness.  Patient is moving upper and lower extremities well without any limitations.  There is a metal wire that was holding together teeth 5 through 12 as a splint that appears to be dislodged from tooth #9 and is sticking straight out of patient's mouth.  There is not appear to be any other oral trauma.  There is poor dentition and multiple areas of ulceration.  There is some mild facial edema that is nontender and likely resolving from previous injury.  Patient did not have a repeat fall.  Patient is here for a dental wire displacement.  Dental is aware and will see the patient.

## 2025-07-19 NOTE — ED ADULT NURSE NOTE - CHIEF COMPLAINT QUOTE
Hx dizziness, CHF, Hypothyroid, dizziness. Pt dc'd 7/17 for traumatic subdural hematoma s/p fall/syncope on concrete. Pt states he fell again after d/c from [facility] 7/17. Pt states "I found this one", while pointing to lip with what appears to be a metal pin sticking out of pt's upper lip.

## 2025-07-19 NOTE — ED PROVIDER NOTE - CLINICAL SUMMARY MEDICAL DECISION MAKING FREE TEXT BOX
85-year-old male with PMH of CHF, chronic dizziness, hypothyroid, hearing loss (very hard of hearing) and recent hospitalization for a mechanical fall resulting in several facial fractures, dental fractures presents to the ED with complaints of a metal wire sticking out of his mouth.  Contrary to triage note is denying a subsequent fall after discharge on 7/12/2025, and he is not complaining of any new injuries patient states that he noticed some metal wire sticking out from below his tooth yesterday no not been able to eat since.  He is endorsing upper lip pain, and the metal wire is protruding from his mouth just below his upper lip.  He denies any other complaints including new fall or other injury to mouth, bleeding from the mouth or lip, syncope, chest pain, shortness of breath, nausea, vomiting, diarrhea, abdominal pain.    On exam is a 85-year-old gentleman, very hard of hearing but easily and appropriately answers questions when spoken to very very loudly.  It is obvious that there is a small wire sticking out of his mouth just below his upper lip midline upon further examination.  This appears to be a bent dental splint that was placed by our dental colleagues during patient's last admission a monofilament splint from tooth #5 to tooth #12, which is bent around toot 9 and sticking straight out of mouth.  Days there does not appear to be any trauma caused by the displaced splint to the patient's lip or mouth. The pain that the patient is experiencing appears to be coming from the 3 small simple interrupted 4-0 Chromic Gut sutures on the maxillary labial mucosa.  The sutures are intact and there is no bleeding oozing or new trauma to the lip, teeth or oral mucosa.  Some bruising to the superior hard palate, likely sequela from maxillary fractures.  Otherwise patient has no new trauma about the head, face, within the mouth.  Unclear how the monofilament wire became dislodged and bent from the teeth, however will consult dental for insight onto whether this should be simply removed and replaced or if any other plan is necessary. As there has been no new traum and pt is atraumatic otherwise, no other workup to initiate at this time.

## 2025-07-19 NOTE — PROGRESS NOTE ADULT - SUBJECTIVE AND OBJECTIVE BOX
CC:  y/o  presents with __ pain with cc of trauma to upper front teeth. reports pt fell on concrete while running. __ denies vomiting, loss of consciousness, changes in behavior and/or vision.           Med HX:No pertinent past medical history  No significant past surgical history    Rx - no medications, NKDA    Social Hx: non-contributory    EOE: WNL, mandible FROM. MOM WNL  TMJ (WNL)  Trismus (-)  LAD (-)  Dysphagia (-)  Swelling (-)    IOE: primary dentition. # rotated and intrusively luxated approximately 6mm into gingiva. # mildly luxated toward the palate. No mobility noted. Teeth not in traumatic occlusion or aspiration risk. Small superficial hemostatic laceration left upper lip.  Hard/Soft palate (WNL)  Tongue/Floor of Mouth (WNL)  Buccal Mucosa (WNL)  Percussion (-)  Palpation (-)   Swelling (-)    Radiographs: max occl with ___.     Assessment: # intrusive luxation, # luxated toward the palate.    Treatment: Discussed clinical and radiographic findings with __. Advised to continue monitoring dentition at this time for signs of discoloration (blue/gray) or for signs of acute infection ("pimple" on gingiva). __understands to return for emergent treatment if signs of acute infection present, which can be within the near future. Recommended following up with outpatient private pediatric dentist within 2 weeks, soft diet, and OTC pain meds prn. __understood. Answered all questions.     Recommendations:   1. OTC pain medications as needed. Soft diet.   2. Seek comprehensive dental care with outpatient private dentist.  3. If any difficulty breathing/swallowing or fever and swelling occur, return to ED.    Anai Munguia DDS #[id number]   CC:  y/o  presents with __ pain with cc of trauma to upper front teeth. reports pt fell on concrete while running. __ denies vomiting, loss of consciousness, changes in behavior and/or vision.     HPI:     PAST MEDICAL HISTORY:  Bradycardia     Chronic systolic congestive heart failure     HTN (hypertension)     Hypothyroidism.     PAST SURGICAL HISTORY:  No significant past surgical history.     FAMILY HISTORY:  Sibling  Still living? Unknown  FH: heart disease, Age at diagnosis: Age Unknown.     Tobacco Usage:  · Tobacco Usage	Unknown if ever smoked    ALLERGIES AND HOME MEDICATIONS:   Allergies:        Allergies:  	No Known Allergies:     Home Medications:   * Patient Currently Takes Medications as of 11-Jul-2025 18:20 documented in Structured Notes  · 	amoxicillin-clavulanate 875 mg-125 mg oral tablet: 1 tab(s) orally every 12 hours  · 	levothyroxine 25 mcg (0.025 mg) oral tablet: 1 tab(s) orally once a day  · 	Multiple Vitamins oral tablet: 1 tab(s) orally once a day  · 	sodium chloride 0.65% nasal spray: 2 spray(s) nasal 3 times a day  · 	amLODIPine 2.5 mg oral tablet: 1 tab(s) orally once a day  · 	ferrous sulfate 325 mg (65 mg elemental iron) oral tablet: 1 tab(s) orally once a day  · 	carvedilol 12.5 mg oral tablet: 1 tab(s) orally every 12 hours  · 	rosuvastatin 5 mg oral tablet: 1 tab(s) orally once a day (at bedtime)  · 	meclizine 25 mg oral tablet: 1 tab(s) orally 2 times a day  · 	acetaminophen 325 mg oral tablet: 2 tab(s) orally every 6 hours As needed Temp greater or equal to 38C (100.4F), Mild Pain (1 - 3)  · 	omeprazole 20 mg oral delayed release capsule: 1 cap(s) orally once a day 1/2 to 1 hour before morning meal            Med HX:No pertinent past medical history  No significant past surgical history    Rx - no medications, NKDA    Social Hx: non-contributory    EOE: WNL, mandible FROM. MOM WNL  TMJ (WNL)  Trismus (-)  LAD (-)  Dysphagia (-)  Swelling (-)    IOE: primary dentition. # rotated and intrusively luxated approximately 6mm into gingiva. # mildly luxated toward the palate. No mobility noted. Teeth not in traumatic occlusion or aspiration risk. Small superficial hemostatic laceration left upper lip.  Hard/Soft palate (WNL)  Tongue/Floor of Mouth (WNL)  Buccal Mucosa (WNL)  Percussion (-)  Palpation (-)   Swelling (-)    Radiographs: max occl with ___.     Assessment: # intrusive luxation, # luxated toward the palate.    Treatment: Discussed clinical and radiographic findings with __. Advised to continue monitoring dentition at this time for signs of discoloration (blue/gray) or for signs of acute infection ("pimple" on gingiva). __understands to return for emergent treatment if signs of acute infection present, which can be within the near future. Recommended following up with outpatient private pediatric dentist within 2 weeks, soft diet, and OTC pain meds prn. __understood. Answered all questions.     Recommendations:   1. OTC pain medications as needed. Soft diet.   2. Seek comprehensive dental care with outpatient private dentist.  3. If any difficulty breathing/swallowing or fever and swelling occur, return to ED.    Anai Munguia DDS #[id number]   CC: Patient complains of foreign object lodged in upper lip.    HPI: Patient reported to the ED on 7/9/25 following a fall, at which time a splint was placed from teeth #5-12. Today, the patient reports being unable to eat due to the wire protruding.     Medical History: Bradycardia, Chronic systolic congestive heart failure, HTN (hypertension), Hypothyroidism.     Allergies: No known allergies    Home Medications:   · 	amoxicillin-clavulanate 875 mg-125 mg oral tablet: 1 tab(s) orally every 12 hours  · 	levothyroxine 25 mcg (0.025 mg) oral tablet: 1 tab(s) orally once a day  · 	Multiple Vitamins oral tablet: 1 tab(s) orally once a day  · 	sodium chloride 0.65% nasal spray: 2 spray(s) nasal 3 times a day  · 	amLODIPine 2.5 mg oral tablet: 1 tab(s) orally once a day  · 	ferrous sulfate 325 mg (65 mg elemental iron) oral tablet: 1 tab(s) orally once a day  · 	carvedilol 12.5 mg oral tablet: 1 tab(s) orally every 12 hours  · 	rosuvastatin 5 mg oral tablet: 1 tab(s) orally once a day (at bedtime)  · 	meclizine 25 mg oral tablet: 1 tab(s) orally 2 times a day  · 	acetaminophen 325 mg oral tablet: 2 tab(s) orally every 6 hours As needed Temp greater or equal to 38C (100.4F), Mild Pain (1 - 3)  · 	omeprazole 20 mg oral delayed release capsule: 1 cap(s) orally once a day 1/2 to 1 hour before morning meal    Social Hx: Patient lives alone, does not have reliable transportation, and has difficult hearing.     EOE:   TMJ (WNL)  Trismus (-)  LAD (-)  Dysphagia (-)  Swelling (+): Upper lip is swollen from the previous trauma that happened about a week ago. In addition, the upper lip is irritated/swollen around the area where the wire is protruding between #8 and #9.     IOE:   Hard/Soft palate (WNL)  Tongue/Floor of Mouth (WNL)  Buccal Mucosa (WNL)  Percussion (-)  Palpation (-)   Swelling (+): Swelling associated with the upper lip extending to the buccal anterior maxillary gingiva where lacerations were sutured at the last visit.     Radiographs: None Exposed    Assessment:  Irritation/Inflammation of the upper lip due to dislodgement of the splint from teeth #5-8 and bending of the splint to protrude outside theth.     Treatment:   Completed an extraoral and intraoral examination. Cut the splint between #8-9 to remove the segment that was protruding using a distal end cutter. Placed flowable composite at the end of the trimmed wire and cured to ensure that it was smooth and did not further traumatize the lip. Provided post operative instructions and emphasized the importance of the follow up visit to the dental clinic for splint removal.     Recommendations:   1. OTC pain medications as needed. Soft diet.   2. Follow up Appointment Scheduled at [facility] 7/23/25 at 1pm    Marj Luna DDS #[id number]  Joel Fuentes DDS #[id number]

## 2025-07-19 NOTE — ED PROVIDER NOTE - NSFOLLOWUPINSTRUCTIONS_ED_ALL_ED_FT
PLEASE FOLLOW UP FOR YOUR DENTAL APPOINTMENT ON 7/30/2025    [facility]  [facility] [address]  Scheduled Appointment: 07/30/2025

## 2025-07-19 NOTE — ED ADULT TRIAGE NOTE - CHIEF COMPLAINT QUOTE
Hx dizziness, CHF, Hypothyroid, dizziness. Pt dc'd 7/17 for traumatic subdural hematoma s/p fall/syncope on concrete. Pt states he fell again after d/c from [facility] 7/17. Pt states "I found this one", while pointing to lip with what appears to be a metal pin sticking out of pt's upper lip.

## 2025-07-19 NOTE — ED ADULT NURSE NOTE - NSFALLRISKINTERV_ED_ALL_ED

## 2025-07-19 NOTE — ED PROVIDER NOTE - PROGRESS NOTE DETAILS
detnal at bedside clipped excess dental splint, pt reporting improved comfort after procedure LEYDI Torres: Patient seen by social work.  Patient refuses to provide home address.  Patient states he would like to go home by bus.  He was provided to breakfast meals and additional snacks.  Social work had seen him again.  I discussed with the patient that he is cleared to be discharged.  We have requested his home address to assist in getting him home.  Patient refuses to give us address.  Patient prefers to call his  and his [facility] and then will look to take public transportation back home.  Patient remains stable and comfortable in grewal Ada Bass MD, EM Attending:    Patient endorsed to me at signout waiting for morning time to take the bus.  At this time patient has been spoken to multiple times by the PA and social work, refuses taxi multiple times, has been told the results of his workup and discharge plan multiple times as well.  Patient verbalized understanding is alert and oriented x 4 is ambulatory and tolerating p.o. without issue. Ada Bass MD, EM Attending:  Patient endorsed to me at signout waiting for morning time to take the bus.  At this time patient has been spoken to multiple times by the PA and social work, refuses taxi multiple times, has been told the results of his workup and discharge plan multiple times as well.  Patient verbalized understanding is alert and oriented x 4 is ambulatory and tolerating p.o. without issue.   Patient refusing to leave, despite expressing desire to leave.  Patient is requesting a lot of food to put in his bag, but cannot clearly state where he lives- reports that he lives on [facility] [address] [facility] in [place] [facility] but does not know his home address, states that he is going to take the bus to go straight to [facility], however also was requesting to remain in the ER for an additional 3 to 4 hours.  After multiple attempts by social work and the ED staff to help coordinate discharge for the patient, spoke to CM to figure out safe discharge plan. CM requested psych eval for capacity. Ada Bass MD, EM Attending:  Patient endorsed to me at signout waiting for morning time to take the bus.  At this time patient has been spoken to multiple times by the PA and social work, refuses taxi multiple times, has been told the results of his workup and discharge plan multiple times as well.  Patient verbalized understanding is alert and oriented x 4 is ambulatory and tolerating p.o. without issue. Patient refusing to leave, despite expressing desire to leave.  Patient is requesting a lot of food to put in his bag, but cannot clearly state where he lives- reports that he lives on [address] in [facility] but does not know his home address, states that he is going to take the bus to go straight to [facility], however also was requesting to remain in the ER for an additional 3 to 4 hours.  After multiple attempts by social work and the ED staff to help coordinate discharge for the patient, spoke to CM to figure out safe discharge plan. Ada Bass MD, EM Attending:  SAMANTA and TYRONE at bedside, are coordinating safe discharge for patient Ada Bass MD, EM Attending:  SAMANTA and SW at bedside, are coordinating safe discharge for patient, suraj is being arranged for his home address which CM/SW were able to find and confirm with patient

## 2025-07-19 NOTE — ED PROVIDER NOTE - NSICDXPASTMEDICALHX_GEN_ALL_CORE_FT
PAST MEDICAL HISTORY:  Bradycardia     Chronic systolic congestive heart failure     HTN (hypertension)     Hypothyroidism      Simple: Patient demonstrates quick and easy understanding/Verbalized Understanding

## 2025-07-19 NOTE — ED PROVIDER NOTE - PATIENT PORTAL LINK FT
You can access the FollowMyHealth Patient Portal offered by [facility] by registering at the following website: [email or web address]. By joining [facility]’s FollowMyHealth portal, you will also be able to view your health information using other applications (apps) compatible with our system.

## 2025-07-19 NOTE — ED PROVIDER NOTE - NSFOLLOWUPCLINICS_GEN_ALL_ED_FT
Oral & Maxillofacial Surgery  Department of Dental Medicine  [address] [place] [address]  Phone: [phone]  Fax: [phone]

## 2025-07-19 NOTE — ED ADULT NURSE NOTE - OBJECTIVE STATEMENT
Received the patient is room 24 with c/o pain in the upper lip and noticed some metal wire sticking out from below his tooth yesterday no not been able to eat since.  He is endorsing upper lip pain, and the metal wire is protruding from his mouth just below his upper lip. Pt dc'd 7/17 for traumatic subdural hematoma s/p fall on concrete.  Patient denied any new falls prior to this visit. Not in acute distress. alert and oriented x3, hard of hearing. Safety maintained. Fall precautions maintained. Evaluated bu provider. For dental consult. Due meds given. Nursing care is ongoing

## 2025-07-20 VITALS
DIASTOLIC BLOOD PRESSURE: 71 MMHG | HEART RATE: 76 BPM | OXYGEN SATURATION: 100 % | SYSTOLIC BLOOD PRESSURE: 118 MMHG | TEMPERATURE: 98 F | RESPIRATION RATE: 16 BRPM

## 2025-07-20 NOTE — PROVIDER CONTACT NOTE (OTHER) - BACKGROUND
. Pt reports that he lives alone and would be too dark. Pt denies any family near by or involved. Pt reports address on chart as an old address. Pt verbalized plan to return home by bus in the AM. Pt

## 2025-07-20 NOTE — ED ADULT NURSE REASSESSMENT NOTE - NS ED NURSE REASSESS COMMENT FT1
Patient resting on stretcher, Not in acute distress. alert and oriented x3, ambulatory at baseline. VSS, Resps are even and non labored. Safety maintained. fall precautions are in place. nursing care is ongoing.
Report received from FRANKO Perez. Pt resting comfortably on stretcher, NAD noted. Pending discharge. Frequent monitoring in place.

## 2025-07-20 NOTE — CHART NOTE - NSCHARTNOTEFT_GEN_A_CORE
informed by provider that patient is discharged and may need assistance getting home.  met with patient at bedside and introduced self and role. Patient agreeable to speaking with . Patient reported he’s domiciled at [address] in [place]. He lives alone and denies any homecare services in the home. Patient stated he’s independent with ADLs and IADLs; he ambulates independently with a walker. He reported he has enough food in the home and denies any SDOH needs.      Patient said he’s unable to pay for a taxi home and his insurance doesn’t cover non-emergency transportation.  scheduled taxi through [facility] for safe discharge planning; Booking #[id number]. Patient verbalized understanding that the taxi today is a onetime courtesy to get him home safely.  discussed the above with provider who’s in agreement. No further social work intervention is required at this time.

## 2025-07-20 NOTE — PROVIDER CONTACT NOTE (OTHER) - ASSESSMENT
requesting 8-9am. Pt offered metro card for morning, but declines yelling that he has money. He reports that he takes the [employer] bus to [place].

## 2025-07-20 NOTE — PROVIDER CONTACT NOTE (OTHER) - SITUATION
TYRONE assistance with taxi requested for pt cleared for discharge. Taxi transport arranged. Writer then met with pt at bedside. Pt declining to leave at this time of night requesting to go in the morning

## 2025-07-23 ENCOUNTER — APPOINTMENT (OUTPATIENT)
Age: 85
End: 2025-07-23

## 2025-07-31 ENCOUNTER — EMERGENCY (EMERGENCY)
Facility: HOSPITAL | Age: 85
LOS: 1 days | End: 2025-07-31
Admitting: STUDENT IN AN ORGANIZED HEALTH CARE EDUCATION/TRAINING PROGRAM
Payer: MEDICARE

## 2025-07-31 VITALS
TEMPERATURE: 98 F | DIASTOLIC BLOOD PRESSURE: 77 MMHG | HEART RATE: 85 BPM | SYSTOLIC BLOOD PRESSURE: 133 MMHG | WEIGHT: 82.01 LBS | HEIGHT: 60 IN | OXYGEN SATURATION: 98 % | RESPIRATION RATE: 20 BRPM

## 2025-07-31 VITALS
DIASTOLIC BLOOD PRESSURE: 72 MMHG | HEART RATE: 67 BPM | RESPIRATION RATE: 14 BRPM | SYSTOLIC BLOOD PRESSURE: 118 MMHG | TEMPERATURE: 98 F | OXYGEN SATURATION: 97 %

## 2025-07-31 LAB
ALBUMIN SERPL ELPH-MCNC: 4 G/DL — SIGNIFICANT CHANGE UP (ref 3.3–5)
ALP SERPL-CCNC: 88 U/L — SIGNIFICANT CHANGE UP (ref 40–120)
ALT FLD-CCNC: 11 U/L — SIGNIFICANT CHANGE UP (ref 4–41)
ANION GAP SERPL CALC-SCNC: 13 MMOL/L — SIGNIFICANT CHANGE UP (ref 7–14)
AST SERPL-CCNC: 24 U/L — SIGNIFICANT CHANGE UP (ref 4–40)
BASOPHILS # BLD AUTO: 0.05 K/UL — SIGNIFICANT CHANGE UP (ref 0–0.2)
BASOPHILS NFR BLD AUTO: 1 % — SIGNIFICANT CHANGE UP (ref 0–2)
BILIRUB SERPL-MCNC: 0.3 MG/DL — SIGNIFICANT CHANGE UP (ref 0.2–1.2)
BUN SERPL-MCNC: 15 MG/DL — SIGNIFICANT CHANGE UP (ref 7–23)
CALCIUM SERPL-MCNC: 9.2 MG/DL — SIGNIFICANT CHANGE UP (ref 8.4–10.5)
CHLORIDE SERPL-SCNC: 95 MMOL/L — LOW (ref 98–107)
CO2 SERPL-SCNC: 28 MMOL/L — SIGNIFICANT CHANGE UP (ref 22–31)
CREAT SERPL-MCNC: 1.4 MG/DL — HIGH (ref 0.5–1.3)
EGFR: 49 ML/MIN/1.73M2 — LOW
EGFR: 49 ML/MIN/1.73M2 — LOW
EOSINOPHIL # BLD AUTO: 0.18 K/UL — SIGNIFICANT CHANGE UP (ref 0–0.5)
EOSINOPHIL NFR BLD AUTO: 3.6 % — SIGNIFICANT CHANGE UP (ref 0–6)
GLUCOSE SERPL-MCNC: 82 MG/DL — SIGNIFICANT CHANGE UP (ref 70–99)
HCT VFR BLD CALC: 29.2 % — LOW (ref 39–50)
HGB BLD-MCNC: 9.2 G/DL — LOW (ref 13–17)
IMM GRANULOCYTES # BLD AUTO: 0.01 K/UL — SIGNIFICANT CHANGE UP (ref 0–0.07)
IMM GRANULOCYTES NFR BLD AUTO: 0.2 % — SIGNIFICANT CHANGE UP (ref 0–0.9)
LYMPHOCYTES # BLD AUTO: 1.04 K/UL — SIGNIFICANT CHANGE UP (ref 1–3.3)
LYMPHOCYTES NFR BLD AUTO: 20.6 % — SIGNIFICANT CHANGE UP (ref 13–44)
MCHC RBC-ENTMCNC: 29.6 PG — SIGNIFICANT CHANGE UP (ref 27–34)
MCHC RBC-ENTMCNC: 31.5 G/DL — LOW (ref 32–36)
MCV RBC AUTO: 93.9 FL — SIGNIFICANT CHANGE UP (ref 80–100)
MONOCYTES # BLD AUTO: 0.68 K/UL — SIGNIFICANT CHANGE UP (ref 0–0.9)
MONOCYTES NFR BLD AUTO: 13.4 % — SIGNIFICANT CHANGE UP (ref 2–14)
NEUTROPHILS # BLD AUTO: 3.1 K/UL — SIGNIFICANT CHANGE UP (ref 1.8–7.4)
NEUTROPHILS NFR BLD AUTO: 61.2 % — SIGNIFICANT CHANGE UP (ref 43–77)
NRBC # BLD AUTO: 0 K/UL — SIGNIFICANT CHANGE UP (ref 0–0)
NRBC # FLD: 0 K/UL — SIGNIFICANT CHANGE UP (ref 0–0)
NRBC BLD AUTO-RTO: 0 /100 WBCS — SIGNIFICANT CHANGE UP (ref 0–0)
PLATELET # BLD AUTO: 193 K/UL — SIGNIFICANT CHANGE UP (ref 150–400)
PMV BLD: 9.2 FL — SIGNIFICANT CHANGE UP (ref 7–13)
POTASSIUM SERPL-MCNC: 4 MMOL/L — SIGNIFICANT CHANGE UP (ref 3.5–5.3)
POTASSIUM SERPL-SCNC: 4 MMOL/L — SIGNIFICANT CHANGE UP (ref 3.5–5.3)
PROT SERPL-MCNC: 7.4 G/DL — SIGNIFICANT CHANGE UP (ref 6–8.3)
RBC # BLD: 3.11 M/UL — LOW (ref 4.2–5.8)
RBC # FLD: 16 % — HIGH (ref 10.3–14.5)
SODIUM SERPL-SCNC: 136 MMOL/L — SIGNIFICANT CHANGE UP (ref 135–145)
WBC # BLD: 5.06 K/UL — SIGNIFICANT CHANGE UP (ref 3.8–10.5)
WBC # FLD AUTO: 5.06 K/UL — SIGNIFICANT CHANGE UP (ref 3.8–10.5)

## 2025-07-31 PROCEDURE — 99285 EMERGENCY DEPT VISIT HI MDM: CPT

## 2025-07-31 PROCEDURE — 70487 CT MAXILLOFACIAL W/DYE: CPT | Mod: 26

## 2025-08-04 ENCOUNTER — APPOINTMENT (OUTPATIENT)
Dept: RADIOLOGY | Facility: HOSPITAL | Age: 85
End: 2025-08-04

## 2025-08-05 ENCOUNTER — APPOINTMENT (OUTPATIENT)
Age: 85
End: 2025-08-05
Payer: SELF-PAY

## 2025-08-05 ENCOUNTER — APPOINTMENT (OUTPATIENT)
Dept: RADIOLOGY | Facility: HOSPITAL | Age: 85
End: 2025-08-05

## 2025-08-05 PROCEDURE — 99024 POSTOP FOLLOW-UP VISIT: CPT

## 2025-09-05 ENCOUNTER — APPOINTMENT (OUTPATIENT)
Age: 85
End: 2025-09-05